# Patient Record
Sex: FEMALE | Race: WHITE | NOT HISPANIC OR LATINO
[De-identification: names, ages, dates, MRNs, and addresses within clinical notes are randomized per-mention and may not be internally consistent; named-entity substitution may affect disease eponyms.]

---

## 2017-08-31 ENCOUNTER — TRANSCRIPTION ENCOUNTER (OUTPATIENT)
Age: 32
End: 2017-08-31

## 2018-02-24 ENCOUNTER — TRANSCRIPTION ENCOUNTER (OUTPATIENT)
Age: 33
End: 2018-02-24

## 2019-10-14 ENCOUNTER — APPOINTMENT (OUTPATIENT)
Dept: OBGYN | Facility: CLINIC | Age: 34
End: 2019-10-14

## 2021-02-22 ENCOUNTER — APPOINTMENT (OUTPATIENT)
Dept: OBGYN | Facility: CLINIC | Age: 36
End: 2021-02-22
Payer: COMMERCIAL

## 2021-02-22 PROCEDURE — 99385 PREV VISIT NEW AGE 18-39: CPT

## 2021-02-22 PROCEDURE — 99072 ADDL SUPL MATRL&STAF TM PHE: CPT

## 2021-02-22 PROCEDURE — 36415 COLL VENOUS BLD VENIPUNCTURE: CPT

## 2021-02-24 ENCOUNTER — TRANSCRIPTION ENCOUNTER (OUTPATIENT)
Age: 36
End: 2021-02-24

## 2021-02-25 ENCOUNTER — APPOINTMENT (OUTPATIENT)
Dept: OBGYN | Facility: CLINIC | Age: 36
End: 2021-02-25

## 2021-03-04 ENCOUNTER — APPOINTMENT (OUTPATIENT)
Dept: ANTEPARTUM | Facility: CLINIC | Age: 36
End: 2021-03-04
Payer: COMMERCIAL

## 2021-03-04 PROCEDURE — 76801 OB US < 14 WKS SINGLE FETUS: CPT

## 2021-03-04 PROCEDURE — 99072 ADDL SUPL MATRL&STAF TM PHE: CPT

## 2021-03-04 PROCEDURE — 76813 OB US NUCHAL MEAS 1 GEST: CPT

## 2021-04-01 ENCOUNTER — APPOINTMENT (OUTPATIENT)
Dept: OBGYN | Facility: CLINIC | Age: 36
End: 2021-04-01
Payer: COMMERCIAL

## 2021-04-01 PROCEDURE — 0502F SUBSEQUENT PRENATAL CARE: CPT

## 2021-04-28 ENCOUNTER — APPOINTMENT (OUTPATIENT)
Dept: ANTEPARTUM | Facility: CLINIC | Age: 36
End: 2021-04-28
Payer: COMMERCIAL

## 2021-04-28 ENCOUNTER — NON-APPOINTMENT (OUTPATIENT)
Age: 36
End: 2021-04-28

## 2021-04-28 PROCEDURE — 76817 TRANSVAGINAL US OBSTETRIC: CPT

## 2021-04-28 PROCEDURE — 99072 ADDL SUPL MATRL&STAF TM PHE: CPT

## 2021-04-28 PROCEDURE — 76805 OB US >/= 14 WKS SNGL FETUS: CPT

## 2021-04-29 ENCOUNTER — APPOINTMENT (OUTPATIENT)
Dept: OBGYN | Facility: CLINIC | Age: 36
End: 2021-04-29
Payer: COMMERCIAL

## 2021-04-29 ENCOUNTER — OUTPATIENT (OUTPATIENT)
Dept: OUTPATIENT SERVICES | Age: 36
LOS: 1 days | Discharge: HOME | End: 2021-04-29

## 2021-04-29 ENCOUNTER — OUTPATIENT (OUTPATIENT)
Dept: OUTPATIENT SERVICES | Facility: HOSPITAL | Age: 36
LOS: 1 days | End: 2021-04-29
Payer: COMMERCIAL

## 2021-04-29 DIAGNOSIS — Q25.45 DOUBLE AORTIC ARCH: ICD-10-CM

## 2021-04-29 PROBLEM — Z00.00 ENCOUNTER FOR PREVENTIVE HEALTH EXAMINATION: Status: ACTIVE | Noted: 2021-04-29

## 2021-04-29 PROCEDURE — 76946 ECHO GUIDE FOR AMNIOCENTESIS: CPT

## 2021-04-29 PROCEDURE — 88271 CYTOGENETICS DNA PROBE: CPT

## 2021-04-29 PROCEDURE — 99072 ADDL SUPL MATRL&STAF TM PHE: CPT

## 2021-04-29 PROCEDURE — 81229 CYTOG ALYS CHRML ABNR SNPCGH: CPT

## 2021-04-29 PROCEDURE — 82106 ALPHA-FETOPROTEIN AMNIOTIC: CPT

## 2021-04-29 PROCEDURE — 88291 CYTO/MOLECULAR REPORT: CPT

## 2021-04-29 PROCEDURE — 88275 CYTOGENETICS 100-300: CPT

## 2021-04-29 PROCEDURE — 88285 CHROMOSOME COUNT ADDITIONAL: CPT

## 2021-04-29 PROCEDURE — 76815 OB US LIMITED FETUS(S): CPT

## 2021-04-29 PROCEDURE — 88280 CHROMOSOME KARYOTYPE STUDY: CPT

## 2021-04-29 PROCEDURE — 88269 CHROMOSOME ANALYS AMNIOTIC: CPT

## 2021-04-29 PROCEDURE — 88235 TISSUE CULTURE PLACENTA: CPT

## 2021-04-30 ENCOUNTER — APPOINTMENT (OUTPATIENT)
Dept: PEDIATRIC CARDIOLOGY | Facility: CLINIC | Age: 36
End: 2021-04-30
Payer: COMMERCIAL

## 2021-04-30 DIAGNOSIS — Z36.89 ENCOUNTER FOR OTHER SPECIFIED ANTENATAL SCREENING: ICD-10-CM

## 2021-04-30 PROCEDURE — 93325 DOPPLER ECHO COLOR FLOW MAPG: CPT | Mod: 59

## 2021-04-30 PROCEDURE — 76827 ECHO EXAM OF FETAL HEART: CPT

## 2021-04-30 PROCEDURE — 99244 OFF/OP CNSLTJ NEW/EST MOD 40: CPT | Mod: 25

## 2021-04-30 PROCEDURE — 76825 ECHO EXAM OF FETAL HEART: CPT

## 2021-04-30 PROCEDURE — 76820 UMBILICAL ARTERY ECHO: CPT

## 2021-04-30 PROCEDURE — 99072 ADDL SUPL MATRL&STAF TM PHE: CPT

## 2021-04-30 PROCEDURE — 76821 MIDDLE CEREBRAL ARTERY ECHO: CPT

## 2021-05-13 ENCOUNTER — APPOINTMENT (OUTPATIENT)
Dept: OBGYN | Facility: CLINIC | Age: 36
End: 2021-05-13
Payer: COMMERCIAL

## 2021-05-13 PROCEDURE — 0502F SUBSEQUENT PRENATAL CARE: CPT

## 2021-05-26 ENCOUNTER — APPOINTMENT (OUTPATIENT)
Dept: PEDIATRIC CARDIOLOGY | Facility: CLINIC | Age: 36
End: 2021-05-26
Payer: COMMERCIAL

## 2021-05-26 PROCEDURE — 99213 OFFICE O/P EST LOW 20 MIN: CPT | Mod: 25

## 2021-05-26 PROCEDURE — 76826 ECHO EXAM OF FETAL HEART: CPT

## 2021-05-26 PROCEDURE — 76820 UMBILICAL ARTERY ECHO: CPT

## 2021-05-26 PROCEDURE — 99072 ADDL SUPL MATRL&STAF TM PHE: CPT

## 2021-05-26 PROCEDURE — 76828 ECHO EXAM OF FETAL HEART: CPT

## 2021-05-26 PROCEDURE — 93325 DOPPLER ECHO COLOR FLOW MAPG: CPT | Mod: 59

## 2021-05-26 PROCEDURE — 76821 MIDDLE CEREBRAL ARTERY ECHO: CPT

## 2021-06-01 ENCOUNTER — APPOINTMENT (OUTPATIENT)
Dept: OBGYN | Facility: CLINIC | Age: 36
End: 2021-06-01
Payer: COMMERCIAL

## 2021-06-01 PROCEDURE — 0502F SUBSEQUENT PRENATAL CARE: CPT

## 2021-06-21 ENCOUNTER — APPOINTMENT (OUTPATIENT)
Dept: ANTEPARTUM | Facility: CLINIC | Age: 36
End: 2021-06-21
Payer: COMMERCIAL

## 2021-06-21 PROCEDURE — 99072 ADDL SUPL MATRL&STAF TM PHE: CPT

## 2021-06-21 PROCEDURE — 76819 FETAL BIOPHYS PROFIL W/O NST: CPT

## 2021-06-21 PROCEDURE — 76816 OB US FOLLOW-UP PER FETUS: CPT

## 2021-06-22 ENCOUNTER — APPOINTMENT (OUTPATIENT)
Dept: ANTEPARTUM | Facility: CLINIC | Age: 36
End: 2021-06-22

## 2021-07-06 ENCOUNTER — APPOINTMENT (OUTPATIENT)
Dept: OBGYN | Facility: CLINIC | Age: 36
End: 2021-07-06
Payer: COMMERCIAL

## 2021-07-06 PROCEDURE — 0502F SUBSEQUENT PRENATAL CARE: CPT

## 2021-07-21 ENCOUNTER — APPOINTMENT (OUTPATIENT)
Dept: OBGYN | Facility: CLINIC | Age: 36
End: 2021-07-21
Payer: COMMERCIAL

## 2021-07-21 PROCEDURE — 0502F SUBSEQUENT PRENATAL CARE: CPT

## 2021-08-04 ENCOUNTER — APPOINTMENT (OUTPATIENT)
Dept: ANTEPARTUM | Facility: CLINIC | Age: 36
End: 2021-08-04
Payer: COMMERCIAL

## 2021-08-04 PROCEDURE — 76816 OB US FOLLOW-UP PER FETUS: CPT

## 2021-08-04 PROCEDURE — 76819 FETAL BIOPHYS PROFIL W/O NST: CPT

## 2021-08-08 ENCOUNTER — OUTPATIENT (OUTPATIENT)
Dept: OUTPATIENT SERVICES | Facility: HOSPITAL | Age: 36
LOS: 1 days | Discharge: HOME | End: 2021-08-08
Payer: COMMERCIAL

## 2021-08-08 VITALS
DIASTOLIC BLOOD PRESSURE: 85 MMHG | HEART RATE: 78 BPM | SYSTOLIC BLOOD PRESSURE: 143 MMHG | TEMPERATURE: 98 F | RESPIRATION RATE: 16 BRPM

## 2021-08-08 DIAGNOSIS — Z3A.00 WEEKS OF GESTATION OF PREGNANCY NOT SPECIFIED: ICD-10-CM

## 2021-08-08 DIAGNOSIS — M67.40 GANGLION, UNSPECIFIED SITE: Chronic | ICD-10-CM

## 2021-08-08 DIAGNOSIS — O26.899 OTHER SPECIFIED PREGNANCY RELATED CONDITIONS, UNSPECIFIED TRIMESTER: ICD-10-CM

## 2021-08-08 PROCEDURE — 76818 FETAL BIOPHYS PROFILE W/NST: CPT | Mod: 26

## 2021-08-08 NOTE — OB RN TRIAGE NOTE - NSNURSINGINSTR_OBGYN_ALL_OB_FT
pt evaluated for ptl and pec, no evidence.  pt d/c to home with instructions given by hema np, pt to follwup with  in the office.  plan d/w .

## 2021-08-08 NOTE — OB RN TRIAGE NOTE - CURRENT PREGNANCY COMPLICATIONS, OB PROFILE
IU pregnancy/Gestational Age less than 36 Weeks/Hypertensive Disorder/Other IUI pregnancy/Gestational Age less than 36 Weeks/Hypertensive Disorder/Other

## 2021-08-08 NOTE — OB RN TRIAGE NOTE - CHIEF COMPLAINT QUOTE
"My mucus plug came out, has lower abdominal pains , headache and  high blood pressure at home 170/90 and 165 /91 , I am supposed to be induced for high blood pressures on last week of August " "My mucus plug came out, I have been spotting since last night and havingl lower abdominal pains. Headache  since 1800 and  high blood pressure at home 170/90 and 165 /91 , I am supposed to be induced for high blood pressures on last week of August " "My mucus plug came out, I have been spotting since last night and having lower abdominal pains. Headache  since 1800 and  high blood pressure at home 170/90 and 165 /91 , I am supposed to be induced for high blood pressures on last week of August "

## 2021-08-08 NOTE — OB RN TRIAGE NOTE - NSMATERNALFETALCONCERNS_OBGYN_ALL_OB_FT
FETAL ALERT  RIGHT aortic arch with left ductus arteriosus which makes up a vascular ring , the possibilty of a double aortic arch cannot be completely ruled out

## 2021-08-09 VITALS — SYSTOLIC BLOOD PRESSURE: 130 MMHG | DIASTOLIC BLOOD PRESSURE: 70 MMHG | HEART RATE: 57 BPM

## 2021-08-09 LAB
ALBUMIN SERPL ELPH-MCNC: 3.4 G/DL — SIGNIFICANT CHANGE UP (ref 3.3–5)
ALP SERPL-CCNC: 115 U/L — SIGNIFICANT CHANGE UP (ref 40–120)
ALT FLD-CCNC: 14 U/L — SIGNIFICANT CHANGE UP (ref 4–33)
ANION GAP SERPL CALC-SCNC: 13 MMOL/L — SIGNIFICANT CHANGE UP (ref 7–14)
APPEARANCE UR: CLEAR — SIGNIFICANT CHANGE UP
APTT BLD: 26.2 SEC — LOW (ref 27–36.3)
AST SERPL-CCNC: 19 U/L — SIGNIFICANT CHANGE UP (ref 4–32)
BACTERIA # UR AUTO: ABNORMAL
BASOPHILS # BLD AUTO: 0.08 K/UL — SIGNIFICANT CHANGE UP (ref 0–0.2)
BASOPHILS NFR BLD AUTO: 0.6 % — SIGNIFICANT CHANGE UP (ref 0–2)
BILIRUB SERPL-MCNC: <0.2 MG/DL — SIGNIFICANT CHANGE UP (ref 0.2–1.2)
BILIRUB UR-MCNC: NEGATIVE — SIGNIFICANT CHANGE UP
BUN SERPL-MCNC: 14 MG/DL — SIGNIFICANT CHANGE UP (ref 7–23)
CALCIUM SERPL-MCNC: 9.8 MG/DL — SIGNIFICANT CHANGE UP (ref 8.4–10.5)
CHLORIDE SERPL-SCNC: 102 MMOL/L — SIGNIFICANT CHANGE UP (ref 98–107)
CO2 SERPL-SCNC: 19 MMOL/L — LOW (ref 22–31)
COLOR SPEC: SIGNIFICANT CHANGE UP
CREAT ?TM UR-MCNC: 100 MG/DL — SIGNIFICANT CHANGE UP
CREAT SERPL-MCNC: 0.76 MG/DL — SIGNIFICANT CHANGE UP (ref 0.5–1.3)
DIFF PNL FLD: ABNORMAL
EOSINOPHIL # BLD AUTO: 0.4 K/UL — SIGNIFICANT CHANGE UP (ref 0–0.5)
EOSINOPHIL NFR BLD AUTO: 3.1 % — SIGNIFICANT CHANGE UP (ref 0–6)
EPI CELLS # UR: 3 /HPF — SIGNIFICANT CHANGE UP (ref 0–5)
FIBRINOGEN PPP-MCNC: 677 MG/DL — HIGH (ref 290–520)
GLUCOSE SERPL-MCNC: 104 MG/DL — HIGH (ref 70–99)
GLUCOSE UR QL: NEGATIVE — SIGNIFICANT CHANGE UP
HCT VFR BLD CALC: 32.2 % — LOW (ref 34.5–45)
HGB BLD-MCNC: 10.8 G/DL — LOW (ref 11.5–15.5)
IANC: 8.44 K/UL — SIGNIFICANT CHANGE UP (ref 1.5–8.5)
IMM GRANULOCYTES NFR BLD AUTO: 1.8 % — HIGH (ref 0–1.5)
INR BLD: 1.02 RATIO — SIGNIFICANT CHANGE UP (ref 0.88–1.16)
KETONES UR-MCNC: NEGATIVE — SIGNIFICANT CHANGE UP
LDH SERPL L TO P-CCNC: 180 U/L — SIGNIFICANT CHANGE UP (ref 135–225)
LEUKOCYTE ESTERASE UR-ACNC: NEGATIVE — SIGNIFICANT CHANGE UP
LYMPHOCYTES # BLD AUTO: 23.4 % — SIGNIFICANT CHANGE UP (ref 13–44)
LYMPHOCYTES # BLD AUTO: 3.05 K/UL — SIGNIFICANT CHANGE UP (ref 1–3.3)
MCHC RBC-ENTMCNC: 29.6 PG — SIGNIFICANT CHANGE UP (ref 27–34)
MCHC RBC-ENTMCNC: 33.5 GM/DL — SIGNIFICANT CHANGE UP (ref 32–36)
MCV RBC AUTO: 88.2 FL — SIGNIFICANT CHANGE UP (ref 80–100)
MONOCYTES # BLD AUTO: 0.82 K/UL — SIGNIFICANT CHANGE UP (ref 0–0.9)
MONOCYTES NFR BLD AUTO: 6.3 % — SIGNIFICANT CHANGE UP (ref 2–14)
NEUTROPHILS # BLD AUTO: 8.44 K/UL — HIGH (ref 1.8–7.4)
NEUTROPHILS NFR BLD AUTO: 64.8 % — SIGNIFICANT CHANGE UP (ref 43–77)
NITRITE UR-MCNC: NEGATIVE — SIGNIFICANT CHANGE UP
NRBC # BLD: 0 /100 WBCS — SIGNIFICANT CHANGE UP
NRBC # FLD: 0 K/UL — SIGNIFICANT CHANGE UP
PH UR: 6 — SIGNIFICANT CHANGE UP (ref 5–8)
PLATELET # BLD AUTO: 264 K/UL — SIGNIFICANT CHANGE UP (ref 150–400)
POTASSIUM SERPL-MCNC: 3.8 MMOL/L — SIGNIFICANT CHANGE UP (ref 3.5–5.3)
POTASSIUM SERPL-SCNC: 3.8 MMOL/L — SIGNIFICANT CHANGE UP (ref 3.5–5.3)
PROT ?TM UR-MCNC: 13 MG/DL — SIGNIFICANT CHANGE UP
PROT ?TM UR-MCNC: 13 MG/DL — SIGNIFICANT CHANGE UP
PROT SERPL-MCNC: 6.2 G/DL — SIGNIFICANT CHANGE UP (ref 6–8.3)
PROT UR-MCNC: ABNORMAL
PROT/CREAT UR-RTO: 0.1 RATIO — SIGNIFICANT CHANGE UP (ref 0–0.2)
PROTHROM AB SERPL-ACNC: 11.6 SEC — SIGNIFICANT CHANGE UP (ref 10.6–13.6)
RBC # BLD: 3.65 M/UL — LOW (ref 3.8–5.2)
RBC # FLD: 12.8 % — SIGNIFICANT CHANGE UP (ref 10.3–14.5)
RBC CASTS # UR COMP ASSIST: 2 /HPF — SIGNIFICANT CHANGE UP (ref 0–4)
SODIUM SERPL-SCNC: 134 MMOL/L — LOW (ref 135–145)
SP GR SPEC: 1.02 — SIGNIFICANT CHANGE UP (ref 1.01–1.02)
URATE SERPL-MCNC: 4.3 MG/DL — SIGNIFICANT CHANGE UP (ref 2.5–7)
UROBILINOGEN FLD QL: SIGNIFICANT CHANGE UP
WBC # BLD: 13.03 K/UL — HIGH (ref 3.8–10.5)
WBC # FLD AUTO: 13.03 K/UL — HIGH (ref 3.8–10.5)
WBC UR QL: 2 /HPF — SIGNIFICANT CHANGE UP (ref 0–5)

## 2021-08-09 RX ORDER — ACETAMINOPHEN 500 MG
1000 TABLET ORAL ONCE
Refills: 0 | Status: COMPLETED | OUTPATIENT
Start: 2021-08-09 | End: 2021-08-09

## 2021-08-09 RX ORDER — SODIUM CHLORIDE 9 MG/ML
1000 INJECTION, SOLUTION INTRAVENOUS ONCE
Refills: 0 | Status: COMPLETED | OUTPATIENT
Start: 2021-08-09 | End: 2021-08-09

## 2021-08-09 RX ADMIN — Medication 1000 MILLIGRAM(S): at 00:50

## 2021-08-09 RX ADMIN — Medication 1000 MILLIGRAM(S): at 01:36

## 2021-08-09 RX ADMIN — SODIUM CHLORIDE 2000 MILLILITER(S): 9 INJECTION, SOLUTION INTRAVENOUS at 00:59

## 2021-08-09 NOTE — OB PROVIDER TRIAGE NOTE - NSHPLABSRESULTS_GEN_ALL_CORE
10.8   13.03 )-----------( 264      ( 09 Aug 2021 00:44 )             32.2     PT/INR - ( 09 Aug 2021 00:44 )   PT: 11.6 sec;   INR: 1.02 ratio      PTT - ( 09 Aug 2021 00:44 )  PTT:26.2 sec 10.8   13.03 )-----------( 264      ( 09 Aug 2021 00:44 )             32.2     PT/INR - ( 09 Aug 2021 00:44 )   PT: 11.6 sec;   INR: 1.02 ratio    PTT - ( 09 Aug 2021 00:44 )  PTT:26.2 sec  Fibrinogen:        134<L>  |  102  |  14  ----------------------------<  104<H>  3.8   |  19<L>  |  0.76    Ca    9.8      09 Aug 2021 00:44    TPro  6.2  /  Alb  3.4  /  TBili  <0.2  /  DBili  x   /  AST  19  /  ALT  14  /  AlkPhos  115      Urinalysis Basic - ( 09 Aug 2021 00:44 )    Color: Light Yellow / Appearance: Clear / S.021 / pH: x  Gluc: x / Ketone: Negative  / Bili: Negative / Urobili: <2 mg/dL   Blood: x / Protein: Trace / Nitrite: Negative   Leuk Esterase: Negative / RBC: 2 /HPF / WBC 2 /HPF   Sq Epi: x / Non Sq Epi: 3 /HPF / Bacteria: Occasional 10.8   13.03 )-----------( 264      ( 09 Aug 2021 00:44 )             32.2     PT/INR - ( 09 Aug 2021 00:44 )   PT: 11.6 sec;   INR: 1.02 ratio    PTT - ( 09 Aug 2021 00:44 )  PTT:26.2 sec  Fibrinogen:        134<L>  |  102  |  14  ----------------------------<  104<H>  3.8   |  19<L>  |  0.76    Ca    9.8      09 Aug 2021 00:44    TPro  6.2  /  Alb  3.4  /  TBili  <0.2  /  DBili  x   /  AST  19  /  ALT  14  /  AlkPhos  115      PC Ratio: 0.1    Urinalysis Basic - ( 09 Aug 2021 00:44 )    Color: Light Yellow / Appearance: Clear / S.021 / pH: x  Gluc: x / Ketone: Negative  / Bili: Negative / Urobili: <2 mg/dL   Blood: x / Protein: Trace / Nitrite: Negative   Leuk Esterase: Negative / RBC: 2 /HPF / WBC 2 /HPF   Sq Epi: x / Non Sq Epi: 3 /HPF / Bacteria: Occasional

## 2021-08-09 NOTE — OB PROVIDER TRIAGE NOTE - HISTORY OF PRESENT ILLNESS
Pt. is a 35y/o  EGA 34.6wks reports of losing her mucus plug last night and now sherri every few minutes. Pt. also reports of a headache since 18:00 and blood pressure before coming into triage was 170/90 and 165/91.    AP: gHTN and subchorionic hematoma  Medical Hx: Hashimoto Dx at 10y/o   Surgical HX: Left wrist ganglion cyst removal    OBGYN Hx: PCOS  Meds: Synthroid 112mcg, ASA, PNC  Intolerance: Percocet-Vomiting   Pt. is a 35y/o  EGA 34.6wks reports of losing her mucus plug last night and now sherri every few minutes. Pt. also reports of a headache since 18:00 and blood pressure before coming into triage was 170/90 and 165/91.    AP: IUI pregnancy, gHTN and subchorionic hematoma  Medical Hx: Hashimoto Dx at 8y/o   Surgical HX: Left wrist ganglion cyst removal    OBGYN Hx: PCOS  Meds: Synthroid 112mcg, ASA, PNC  Intolerance: Percocet-Vomiting

## 2021-08-09 NOTE — OB PROVIDER TRIAGE NOTE - NSHPPHYSICALEXAM_GEN_ALL_CORE
ICU Vital Signs Last 24 Hrs  T(C): 36.8 (08 Aug 2021 23:30), Max: 36.8 (08 Aug 2021 23:30)  T(F): 98.2 (08 Aug 2021 23:30), Max: 98.2 (08 Aug 2021 23:30)  HR: 60 (09 Aug 2021 00:42) (60 - 78)  BP: 126/62 (09 Aug 2021 00:42) (124/58 - 143/85)  BP(mean): --  ABP: --  ABP(mean): --  RR: 16 (08 Aug 2021 23:30) (16 - 16)  SpO2: --    Abdomen soft nontender  SVE: Closed/Long  TAS: Cephalic presentation, anterior placenta, DEEPAK: 20.1, BPP: 8/8  FHR:  [place] ICU Vital Signs Last 24 Hrs  T(C): 36.8 (08 Aug 2021 23:30), Max: 36.8 (08 Aug 2021 23:30)  T(F): 98.2 (08 Aug 2021 23:30), Max: 98.2 (08 Aug 2021 23:30)  HR: 60 (09 Aug 2021 00:42) (60 - 78)  BP: 126/62 (09 Aug 2021 00:42) (124/58 - 143/85)  BP(mean): --  ABP: --  ABP(mean): --  RR: 16 (08 Aug 2021 23:30) (16 - 16)  SpO2: --    Abdomen soft nontender  SVE: Closed/Long  TAS: Cephalic presentation, anterior placenta, DEEPAK: 20.1, BPP: 8/8  FHR: 135bpm, moderate variability, accels, no decels,   [place] irregularly

## 2021-08-09 NOTE — OB PROVIDER TRIAGE NOTE - LABOR: CERVICAL CONSISTENCY
[phone] left a VM for pt advising to callback to reschedule appt due to no vaccines in office (storms)
firm

## 2021-08-09 NOTE — OB PROVIDER TRIAGE NOTE - NSMATERNALFETALCONCERNS_OBGYN_ALL_OB_FT
FETAL ALERT  RIGHT aortic arch with left ductus arteriosus which makes up a vascular ring , the possibility of a double aortic arch cannot be completely ruled out

## 2021-08-09 NOTE — OB PROVIDER TRIAGE NOTE - ADDITIONAL INSTRUCTIONS
No evidence of  labor or PEC at this time. Discussed findings with Dr. Diaz. Pt. d/c'd home. Pt. to follow up with next OB appointment. Pt. instructed to return to triage with increase abdominal contractions, leakage of fluid, vaginal bleeding or decrease fetal movement, BP>140/90s, headache unresolved with Tylenol, epigastric/RUQ pain, and N/V. Increase PO hydration encouraged.

## 2021-08-09 NOTE — OB PROVIDER TRIAGE NOTE - NSOBPROVIDERNOTE_OBGYN_ALL_OB_FT
Tylenol 1000mg ordered    @1:45am   Pt. states headache improved with Tylenol.  No evidence of  labor or PEC at this time. Discussed findings with Dr. Diaz. Pt. d/c'd home. Pt. to follow up with next OB appointment. Pt. instructed to return to triage with increase abdominal contractions, leakage of fluid, vaginal bleeding or decrease fetal movement, BP>140/90s, headache unresolved with Tylenol, epigastric/RUQ pain, and N/V. Increase PO hydration encouraged. HELLP labs, Tylenol 1000mg, IV hydration with 1L RL bolus ordered    @1:45am   Pt. states headache improved with Tylenol.  No evidence of  labor or PEC at this time. Discussed findings with Dr. Diaz. Pt. d/c'd home. Pt. to follow up with next OB appointment. Pt. instructed to return to triage with increase abdominal contractions, leakage of fluid, vaginal bleeding or decrease fetal movement, BP>140/90s, headache unresolved with Tylenol, epigastric/RUQ pain, and N/V. Increase PO hydration encouraged.

## 2021-08-10 ENCOUNTER — INPATIENT (INPATIENT)
Facility: HOSPITAL | Age: 36
LOS: 2 days | Discharge: HOME | End: 2021-08-13
Attending: OBSTETRICS & GYNECOLOGY | Admitting: OBSTETRICS & GYNECOLOGY
Payer: COMMERCIAL

## 2021-08-10 ENCOUNTER — APPOINTMENT (OUTPATIENT)
Dept: ANTEPARTUM | Facility: CLINIC | Age: 36
End: 2021-08-10
Payer: COMMERCIAL

## 2021-08-10 VITALS
DIASTOLIC BLOOD PRESSURE: 78 MMHG | HEART RATE: 77 BPM | TEMPERATURE: 98 F | SYSTOLIC BLOOD PRESSURE: 114 MMHG | RESPIRATION RATE: 18 BRPM

## 2021-08-10 DIAGNOSIS — O26.899 OTHER SPECIFIED PREGNANCY RELATED CONDITIONS, UNSPECIFIED TRIMESTER: ICD-10-CM

## 2021-08-10 DIAGNOSIS — Z3A.00 WEEKS OF GESTATION OF PREGNANCY NOT SPECIFIED: ICD-10-CM

## 2021-08-10 DIAGNOSIS — M67.40 GANGLION, UNSPECIFIED SITE: Chronic | ICD-10-CM

## 2021-08-10 PROCEDURE — 76818 FETAL BIOPHYS PROFILE W/NST: CPT

## 2021-08-10 RX ORDER — OXYTOCIN 10 UNIT/ML
333.33 VIAL (ML) INJECTION
Qty: 20 | Refills: 0 | Status: DISCONTINUED | OUTPATIENT
Start: 2021-08-10 | End: 2021-08-11

## 2021-08-10 RX ORDER — BENZOYL PEROXIDE MICRONIZED 5.8 %
1 TOWELETTE (EA) TOPICAL
Qty: 0 | Refills: 0 | DISCHARGE

## 2021-08-10 RX ORDER — SODIUM CHLORIDE 9 MG/ML
1000 INJECTION, SOLUTION INTRAVENOUS
Refills: 0 | Status: DISCONTINUED | OUTPATIENT
Start: 2021-08-10 | End: 2021-08-11

## 2021-08-10 RX ORDER — AMPICILLIN TRIHYDRATE 250 MG
2 CAPSULE ORAL ONCE
Refills: 0 | Status: COMPLETED | OUTPATIENT
Start: 2021-08-10 | End: 2021-08-10

## 2021-08-10 RX ORDER — AMPICILLIN TRIHYDRATE 250 MG
1 CAPSULE ORAL EVERY 4 HOURS
Refills: 0 | Status: DISCONTINUED | OUTPATIENT
Start: 2021-08-10 | End: 2021-08-11

## 2021-08-10 NOTE — OB PROVIDER H&P - PROBLEM SELECTOR PLAN 1
-Admit l&d. Routine labs  -IOL PO cytotec  -Fetus: cat 1 tracing, vertex presentation, continuous monitoring, fetal echo PP   -Ampicillin for PPROM/GBS unknown  -Pain: Patient denies pain management  -GBS pending, urine culture pending   -Covid 19 pending for patient and support person  -Consents signed and witnessed at bedside

## 2021-08-10 NOTE — OB PROVIDER TRIAGE NOTE - NSMATERNALFETALCONCERNS_OBGYN_ALL_OB_FT
FETAL ALERT  RIGHT aorttic arch with left ductus arteriosus which makes up a vascular ring , the possibilty of a double aortic arch cannot be completely ruled out

## 2021-08-10 NOTE — OB PROVIDER H&P - HISTORY OF PRESENT ILLNESS
37 y/o pt 35.2 weeks  IUI pregnancy with known gestational hypertension presents with c/o rupture of membranes, clear odorless fluid @ 1915. pt denies any bleeding or contractions. pt denies any n/v/d, fever or chills. pt endorses +fetal movement  AP complicated by:  -Fetal alert: RIGHT aorttic arch with left ductus arteriosus which makes up a vascular ring , the possibility of a double aortic arch cannot be completely ruled out  -subchorionic hematoma resolved @ 9 weeks   -Gestational hypertension     Allergy:   Percocet- nausea/ vomiting  PMH:  Hypothyroid   PSH:  Left ganglion cyst removal 07'  Right ovarian cyst removed 20'  Polyp removed from uterus 20'  OB: denies  GYN:  PCOS- Metformin 750mg till 12 weeks  Social hx: denies  Medications:  PNV  Synthroid 112mcg  Ferrous sulfate

## 2021-08-10 NOTE — OB PROVIDER TRIAGE NOTE - NSOBPROVIDERNOTE_OBGYN_ALL_OB_FT
37 y/o pt 35.2 weeks  IUI pregnancy with known gestational hypertension presents with PPROM  AP complicated by:  -Fetal alert: RIGHT aorttic arch with left ductus arteriosus which makes up a vascular ring , the possibility of a double aortic arch cannot be completely ruled out, ECHO PP  -subchorionic hematoma resolved @ 9 weeks   -Gestational hypertension   d/w Dr Betts and Dr Bermudez  Plan:  -Admit l&d. Routine labs  -IOL PO cytotec  -Fetus: cat 1 tracing, vertex presentation, continuous monitoring, fetal echo PP   -Ampicillin for PPROM/GBS unknown  -Pain: Patient denies pain management  -GBS pending, urine culture pending   -Covid 19 pending for patient and support person  -Consents signed and witnessed at bedside

## 2021-08-10 NOTE — OB PROVIDER TRIAGE NOTE - HISTORY OF PRESENT ILLNESS
35 y/o pt 35.2 weeks  IUI pregnancy with known gestational hypertension presents with c/o rupture of membranes, clear odorless fluid @ 1915. pt denies any bleeding or contractions. pt denies any n/v/d, fever or chills. pt endorses +fetal movement  AP complicated by:  -Fetal alert: RIGHT aorttic arch with left ductus arteriosus which makes up a vascular ring , the possibility of a double aortic arch cannot be completely ruled out  -subchorionic hematoma resolved @ 9 weeks   -Gestational hypertension     Allergy:   Percocet- nausea/ vomiting  PMH:  Hypothyroid   PSH:  Left ganglion cyst removal 07'  Right ovarian cyst removed 20'  Polyp removed from uterus 20'  OB: denies  GYN:  PCOS- Metformin 750mg till 12 weeks  Social hx: denies  Medications:  PNV  Synthroid 112mcg  Ferrous sulfate

## 2021-08-10 NOTE — OB PROVIDER H&P - NSHPPHYSICALEXAM_GEN_ALL_CORE
Vital Signs Last 24 Hrs  T(C): 36.8 (10 Aug 2021 22:14), Max: 36.8 (10 Aug 2021 22:14)  T(F): 98.2 (10 Aug 2021 22:14), Max: 98.2 (10 Aug 2021 22:14)  HR: 77 (10 Aug 2021 22:14) (77 - 77)  BP: 114/78 (10 Aug 2021 22:14) (114/78 - 114/78)  BP(mean): --  RR: 18 (10 Aug 2021 22:14) (18 - 18)  SpO2: --    Abdomen: soft, non tender. no guarding or rebound tenderness  SVE:  +pooling, clear  +nitrazine  +ferning  1.5/50/-3  TAS: vertex presentation    NSt reactive with moderate variability, cat 1  toco ctx 5-6 minutes

## 2021-08-10 NOTE — OB PROVIDER TRIAGE NOTE - NSHPPHYSICALEXAM_GEN_ALL_CORE
Vital Signs Last 24 Hrs  T(C): 36.8 (10 Aug 2021 22:14), Max: 36.8 (10 Aug 2021 22:14)  T(F): 98.2 (10 Aug 2021 22:14), Max: 98.2 (10 Aug 2021 22:14)  HR: 77 (10 Aug 2021 22:14) (77 - 77)  BP: 114/78 (10 Aug 2021 22:14) (114/78 - 114/78)  BP(mean): --  RR: 18 (10 Aug 2021 22:14) (18 - 18)  SpO2: --    Abdomen: soft, non tender. no guarding or rebound tenderness  SVE:  +pooling, clear  +nitrazine  +ferning  1.5/50/-3  TAS: vertex presentation

## 2021-08-11 ENCOUNTER — TRANSCRIPTION ENCOUNTER (OUTPATIENT)
Age: 36
End: 2021-08-11

## 2021-08-11 DIAGNOSIS — O42.919 PRETERM PREMATURE RUPTURE OF MEMBRANES, UNSPECIFIED AS TO LENGTH OF TIME BETWEEN RUPTURE AND ONSET OF LABOR, UNSPECIFIED TRIMESTER: ICD-10-CM

## 2021-08-11 PROBLEM — E03.9 HYPOTHYROIDISM, UNSPECIFIED: Chronic | Status: ACTIVE | Noted: 2021-08-08

## 2021-08-11 LAB
ANISOCYTOSIS BLD QL: SLIGHT — SIGNIFICANT CHANGE UP
APPEARANCE UR: CLEAR — SIGNIFICANT CHANGE UP
BACTERIA # UR AUTO: NEGATIVE — SIGNIFICANT CHANGE UP
BASOPHILS # BLD AUTO: 0.13 K/UL — SIGNIFICANT CHANGE UP (ref 0–0.2)
BASOPHILS NFR BLD AUTO: 0.9 % — SIGNIFICANT CHANGE UP (ref 0–2)
BILIRUB UR-MCNC: NEGATIVE — SIGNIFICANT CHANGE UP
BLD GP AB SCN SERPL QL: NEGATIVE — SIGNIFICANT CHANGE UP
COLOR SPEC: SIGNIFICANT CHANGE UP
COVID-19 SPIKE DOMAIN AB INTERP: NEGATIVE — SIGNIFICANT CHANGE UP
COVID-19 SPIKE DOMAIN ANTIBODY RESULT: 0.4 U/ML — SIGNIFICANT CHANGE UP
DIFF PNL FLD: NEGATIVE — SIGNIFICANT CHANGE UP
EOSINOPHIL # BLD AUTO: 0.26 K/UL — SIGNIFICANT CHANGE UP (ref 0–0.5)
EOSINOPHIL NFR BLD AUTO: 1.8 % — SIGNIFICANT CHANGE UP (ref 0–6)
EPI CELLS # UR: 2 /HPF — SIGNIFICANT CHANGE UP (ref 0–5)
GIANT PLATELETS BLD QL SMEAR: PRESENT — SIGNIFICANT CHANGE UP
GLUCOSE UR QL: NEGATIVE — SIGNIFICANT CHANGE UP
HBV SURFACE AG SER-ACNC: SIGNIFICANT CHANGE UP
HCT VFR BLD CALC: 33.4 % — LOW (ref 34.5–45)
HGB BLD-MCNC: 11 G/DL — LOW (ref 11.5–15.5)
HIV 1+2 AB+HIV1 P24 AG SERPL QL IA: SIGNIFICANT CHANGE UP
IANC: 9.68 K/UL — HIGH (ref 1.5–8.5)
KETONES UR-MCNC: NEGATIVE — SIGNIFICANT CHANGE UP
LEUKOCYTE ESTERASE UR-ACNC: NEGATIVE — SIGNIFICANT CHANGE UP
LYMPHOCYTES # BLD AUTO: 16.8 % — SIGNIFICANT CHANGE UP (ref 13–44)
LYMPHOCYTES # BLD AUTO: 2.44 K/UL — SIGNIFICANT CHANGE UP (ref 1–3.3)
MACROCYTES BLD QL: SLIGHT — SIGNIFICANT CHANGE UP
MCHC RBC-ENTMCNC: 30 PG — SIGNIFICANT CHANGE UP (ref 27–34)
MCHC RBC-ENTMCNC: 32.9 GM/DL — SIGNIFICANT CHANGE UP (ref 32–36)
MCV RBC AUTO: 91 FL — SIGNIFICANT CHANGE UP (ref 80–100)
MONOCYTES # BLD AUTO: 0.39 K/UL — SIGNIFICANT CHANGE UP (ref 0–0.9)
MONOCYTES NFR BLD AUTO: 2.7 % — SIGNIFICANT CHANGE UP (ref 2–14)
NEUTROPHILS # BLD AUTO: 10.8 K/UL — HIGH (ref 1.8–7.4)
NEUTROPHILS NFR BLD AUTO: 70.8 % — SIGNIFICANT CHANGE UP (ref 43–77)
NEUTS BAND # BLD: 3.5 % — SIGNIFICANT CHANGE UP (ref 0–6)
NITRITE UR-MCNC: NEGATIVE — SIGNIFICANT CHANGE UP
PH UR: 6.5 — SIGNIFICANT CHANGE UP (ref 5–8)
PLAT MORPH BLD: ABNORMAL
PLATELET # BLD AUTO: 266 K/UL — SIGNIFICANT CHANGE UP (ref 150–400)
PLATELET COUNT - ESTIMATE: NORMAL — SIGNIFICANT CHANGE UP
PROT UR-MCNC: ABNORMAL
RBC # BLD: 3.67 M/UL — LOW (ref 3.8–5.2)
RBC # FLD: 13 % — SIGNIFICANT CHANGE UP (ref 10.3–14.5)
RBC BLD AUTO: ABNORMAL
RBC CASTS # UR COMP ASSIST: 2 /HPF — SIGNIFICANT CHANGE UP (ref 0–4)
RH IG SCN BLD-IMP: POSITIVE — SIGNIFICANT CHANGE UP
RH IG SCN BLD-IMP: POSITIVE — SIGNIFICANT CHANGE UP
RUBV IGG SER-ACNC: 3 INDEX — SIGNIFICANT CHANGE UP
RUBV IGG SER-IMP: POSITIVE — SIGNIFICANT CHANGE UP
SARS-COV-2 IGG+IGM SERPL QL IA: 0.4 U/ML — SIGNIFICANT CHANGE UP
SARS-COV-2 IGG+IGM SERPL QL IA: NEGATIVE — SIGNIFICANT CHANGE UP
SARS-COV-2 RNA SPEC QL NAA+PROBE: SIGNIFICANT CHANGE UP
SP GR SPEC: 1.02 — SIGNIFICANT CHANGE UP (ref 1.01–1.02)
T PALLIDUM AB TITR SER: NEGATIVE — SIGNIFICANT CHANGE UP
UROBILINOGEN FLD QL: SIGNIFICANT CHANGE UP
VARIANT LYMPHS # BLD: 3.5 % — SIGNIFICANT CHANGE UP (ref 0–6)
WBC # BLD: 14.53 K/UL — HIGH (ref 3.8–10.5)
WBC # FLD AUTO: 14.53 K/UL — HIGH (ref 3.8–10.5)
WBC UR QL: 1 /HPF — SIGNIFICANT CHANGE UP (ref 0–5)

## 2021-08-11 PROCEDURE — 59400 OBSTETRICAL CARE: CPT | Mod: U7

## 2021-08-11 RX ORDER — DIPHENHYDRAMINE HCL 50 MG
25 CAPSULE ORAL EVERY 6 HOURS
Refills: 0 | Status: DISCONTINUED | OUTPATIENT
Start: 2021-08-11 | End: 2021-08-13

## 2021-08-11 RX ORDER — HYDROCORTISONE 1 %
1 OINTMENT (GRAM) TOPICAL EVERY 6 HOURS
Refills: 0 | Status: DISCONTINUED | OUTPATIENT
Start: 2021-08-11 | End: 2021-08-13

## 2021-08-11 RX ORDER — TETANUS TOXOID, REDUCED DIPHTHERIA TOXOID AND ACELLULAR PERTUSSIS VACCINE, ADSORBED 5; 2.5; 8; 8; 2.5 [IU]/.5ML; [IU]/.5ML; UG/.5ML; UG/.5ML; UG/.5ML
0.5 SUSPENSION INTRAMUSCULAR ONCE
Refills: 0 | Status: COMPLETED | OUTPATIENT
Start: 2021-08-11

## 2021-08-11 RX ORDER — AER TRAVELER 0.5 G/1
1 SOLUTION RECTAL; TOPICAL EVERY 4 HOURS
Refills: 0 | Status: DISCONTINUED | OUTPATIENT
Start: 2021-08-11 | End: 2021-08-13

## 2021-08-11 RX ORDER — OXYTOCIN 10 UNIT/ML
333.33 VIAL (ML) INJECTION
Qty: 20 | Refills: 0 | Status: DISCONTINUED | OUTPATIENT
Start: 2021-08-11 | End: 2021-08-12

## 2021-08-11 RX ORDER — ACETAMINOPHEN 500 MG
975 TABLET ORAL ONCE
Refills: 0 | Status: COMPLETED | OUTPATIENT
Start: 2021-08-11 | End: 2021-08-11

## 2021-08-11 RX ORDER — LANOLIN
1 OINTMENT (GRAM) TOPICAL EVERY 6 HOURS
Refills: 0 | Status: DISCONTINUED | OUTPATIENT
Start: 2021-08-11 | End: 2021-08-13

## 2021-08-11 RX ORDER — MAGNESIUM HYDROXIDE 400 MG/1
30 TABLET, CHEWABLE ORAL
Refills: 0 | Status: DISCONTINUED | OUTPATIENT
Start: 2021-08-11 | End: 2021-08-13

## 2021-08-11 RX ORDER — KETOROLAC TROMETHAMINE 30 MG/ML
30 SYRINGE (ML) INJECTION ONCE
Refills: 0 | Status: DISCONTINUED | OUTPATIENT
Start: 2021-08-11 | End: 2021-08-11

## 2021-08-11 RX ORDER — IBUPROFEN 200 MG
600 TABLET ORAL EVERY 6 HOURS
Refills: 0 | Status: COMPLETED | OUTPATIENT
Start: 2021-08-11 | End: 2022-07-10

## 2021-08-11 RX ORDER — BENZOCAINE 10 %
1 GEL (GRAM) MUCOUS MEMBRANE EVERY 6 HOURS
Refills: 0 | Status: DISCONTINUED | OUTPATIENT
Start: 2021-08-11 | End: 2021-08-13

## 2021-08-11 RX ORDER — DIBUCAINE 1 %
1 OINTMENT (GRAM) RECTAL EVERY 6 HOURS
Refills: 0 | Status: DISCONTINUED | OUTPATIENT
Start: 2021-08-11 | End: 2021-08-13

## 2021-08-11 RX ORDER — SIMETHICONE 80 MG/1
80 TABLET, CHEWABLE ORAL EVERY 4 HOURS
Refills: 0 | Status: DISCONTINUED | OUTPATIENT
Start: 2021-08-11 | End: 2021-08-13

## 2021-08-11 RX ORDER — SODIUM CHLORIDE 9 MG/ML
3 INJECTION INTRAMUSCULAR; INTRAVENOUS; SUBCUTANEOUS EVERY 8 HOURS
Refills: 0 | Status: DISCONTINUED | OUTPATIENT
Start: 2021-08-11 | End: 2021-08-13

## 2021-08-11 RX ORDER — PRAMOXINE HYDROCHLORIDE 150 MG/15G
1 AEROSOL, FOAM RECTAL EVERY 4 HOURS
Refills: 0 | Status: DISCONTINUED | OUTPATIENT
Start: 2021-08-11 | End: 2021-08-13

## 2021-08-11 RX ORDER — OXYTOCIN 10 UNIT/ML
2 VIAL (ML) INJECTION
Qty: 30 | Refills: 0 | Status: DISCONTINUED | OUTPATIENT
Start: 2021-08-11 | End: 2021-08-12

## 2021-08-11 RX ORDER — ACETAMINOPHEN 500 MG
975 TABLET ORAL
Refills: 0 | Status: DISCONTINUED | OUTPATIENT
Start: 2021-08-11 | End: 2021-08-13

## 2021-08-11 RX ADMIN — SODIUM CHLORIDE 125 MILLILITER(S): 9 INJECTION, SOLUTION INTRAVENOUS at 00:59

## 2021-08-11 RX ADMIN — Medication 975 MILLIGRAM(S): at 14:01

## 2021-08-11 RX ADMIN — Medication 216 GRAM(S): at 00:59

## 2021-08-11 RX ADMIN — SODIUM CHLORIDE 3 MILLILITER(S): 9 INJECTION INTRAMUSCULAR; INTRAVENOUS; SUBCUTANEOUS at 21:25

## 2021-08-11 RX ADMIN — Medication 975 MILLIGRAM(S): at 09:00

## 2021-08-11 RX ADMIN — Medication 975 MILLIGRAM(S): at 13:45

## 2021-08-11 RX ADMIN — Medication 2 MILLIUNIT(S)/MIN: at 16:43

## 2021-08-11 RX ADMIN — Medication 975 MILLIGRAM(S): at 21:34

## 2021-08-11 RX ADMIN — Medication 30 MILLIGRAM(S): at 18:50

## 2021-08-11 RX ADMIN — Medication 975 MILLIGRAM(S): at 22:20

## 2021-08-11 RX ADMIN — Medication 975 MILLIGRAM(S): at 08:40

## 2021-08-11 RX ADMIN — Medication 108 GRAM(S): at 07:54

## 2021-08-11 RX ADMIN — Medication 108 GRAM(S): at 12:05

## 2021-08-11 RX ADMIN — Medication 1000 MILLIUNIT(S)/MIN: at 18:50

## 2021-08-11 RX ADMIN — Medication 108 GRAM(S): at 16:00

## 2021-08-11 NOTE — OB RN DELIVERY SUMMARY - NSSELHIDDEN_OBGYN_ALL_OB_FT
[NS_DeliveryAttending1_OBGYN_ALL_OB_FT:[id number]],[NS_DeliveryAssist2_OBGYN_ALL_OB_FT:[id number]],[NS_DeliveryRN_OBGYN_ALL_OB_FT:[id number]]

## 2021-08-11 NOTE — DISCHARGE NOTE OB - MATERIALS PROVIDED
Vaccinations/[facility]  Screening Program/  Immunization Record/Breastfeeding Log/Guide to Postpartum Care/[facility] Hearing Screen Program/Back To Sleep Handout/Shaken Baby Prevention Handout/Birth Certificate Instructions/Tdap Vaccination (VIS Pub Date: 2012)

## 2021-08-11 NOTE — DISCHARGE NOTE OB - PATIENT PORTAL LINK FT
You can access the FollowMyHealth Patient Portal offered by [facility] by registering at the following website: [email or web address]. By joining [facility]’s FollowMyHealth portal, you will also be able to view your health information using other applications (apps) compatible with our system.

## 2021-08-11 NOTE — OB PROVIDER LABOR PROGRESS NOTE - NS_SUBJECTIVE/OBJECTIVE_OBGYN_ALL_OB_FT
Patient examined for cervical change, after 6 minute deceleration to cecilia of 60. ISE placed by PGY2 Dr. Barger. Pitocin off. Patient then found to made rapid descent.
R2 Labor Note    S: Patient evaluated at bedside for cervical change.     O:  T(C): 36.7 (08-11-21 @ 15:05), Max: 37.3 (08-11-21 @ 00:34)  HR: 60 (08-11-21 @ 15:13) (46 - 97)  BP: 106/- (08-11-21 @ 15:11) (100/50 - 144/72)  RR: 15 (08-11-21 @ 01:02) (15 - 18)  SpO2: 100% (08-11-21 @ 15:08) (95% - 100%)

## 2021-08-11 NOTE — DISCHARGE NOTE OB - CARE PROVIDER_API CALL
Jean Trevizo)  MaternalFetal Medicine; Obstetrics and Gynecology  [address] [place] [id number]  Phone: [phone]  Fax: [phone]  Follow Up Time:

## 2021-08-11 NOTE — DISCHARGE NOTE OB - CARE PLAN
1 Assessment and plan of treatment:	paula   Assessment and plan of treatment:	normal diet and activity   Principal Discharge DX:	Vacuum extractor delivery  Assessment and plan of treatment:	normal diet and activity

## 2021-08-11 NOTE — DISCHARGE NOTE OB - MEDICATION SUMMARY - MEDICATIONS TO TAKE
I will START or STAY ON the medications listed below when I get home from the hospital:    ibuprofen 600 mg oral tablet  -- 1 tab(s) by mouth every 6 hours  -- Indication: For pain, as needed

## 2021-08-11 NOTE — OB NEONATOLOGY/PEDIATRICIAN DELIVERY SUMMARY - NSPEDSNEONOTESA_OBGYN_ALL_OB_FT
EOS: 0.54 Called to attend [facility] at 35 and 2/7 weeks for Cat 2 tracing/vacuum assisted delivery. Mom is a 35yo . Prenatal labs: O+, Hep B negative, RPR NR, Rubella pending, HIV negative, GBS pending, COVID negative. PMH significant for elevated BPs, hypothyroidism (on levothyroxine), ganglion cyst (left wrist and right ovary). OB hx significant for IVF pregnancy, subchorionic hematoma, PCOS. Pregnancy complicated by fetal alert for double aortic arch/risk for vascular ring. SROM 1915 on 8/10, ~23 hours PTD, clear fluid. APGARs 9+9. Routine care in DR.  Mom wants to breastfeed but OK with bottle for low glucose, desires Hep B.

## 2021-08-11 NOTE — OB PROVIDER LABOR PROGRESS NOTE - ASSESSMENT
A/P 36y P0 @ 35/2 wks   -IOL: 4Pit   -Analgesia: Epidural in place   -Anticipate     d/w Dr. Jonna Barger PGY-2  
IOL for PPROM   - Will allow tracing to recover and start pushing after patient feel pressure  MINDA Lindsey PGY4  Dr. Coyle at bedside

## 2021-08-11 NOTE — OB RN DELIVERY SUMMARY - NS_SEPSISRSKCALC_OBGYN_ALL_OB_FT
EOS calculated successfully. EOS Risk Factor: 0.5/1000 live births ([facility] national incidence); GA=35w2d; Temp=99.14; ROM=22.783; GBS='Unknown'; Antibiotics='GBS specific antibiotics > 2 hrs prior to birth'

## 2021-08-11 NOTE — OB PROVIDER DELIVERY SUMMARY - NSPROVIDERDELIVERYNOTE_OBGYN_ALL_OB_FT
Vacuum assisted vaginal delivery for category two fetal heart tracing one pull no pop offs. Peds presents at delivery. Shoulders body delivered easily. Delayed cord clamping. Cord clamped and cut. Infant handed to mom. Placenta delivered intact with 3 vessel cord. Vaginal exam revealed intact cervix, sulci, vaginal walls.  Second degree tear in the perineum repaired with 2-0 chromic suture. Excellent hemostasis. Vacuum assisted vaginal delivery for category two fetal heart tracing one pull no pop offs. Peds presents at delivery. Shoulders body delivered easily. Delayed cord clamping. Cord clamped and cut. Infant handed to mom. Placenta delivered intact with 3 vessel cord. Vaginal exam revealed intact cervix, sulci, vaginal walls.  Second degree tear in the perineum repaired with 2-0 chromic suture. Excellent hemostasis.    Cat 2  Discussed risks and options for vacuum delivery. Verbal consent given.    Applied +2/3 oa Mityvac II  One pull and zero pop off.  Excellent maternal effort over intact perineum.  Peds present.

## 2021-08-11 NOTE — CHART NOTE - NSCHARTNOTEFT_GEN_A_CORE
Ms. Ng is a 35 y/o  at 35w2d gestational age admitted for PPROM, now undergoing induction of labor. Prenatal history significant for fetal alert for possible vascular ring; right aortic arch and left ductus arteriosus, unable to exclude double aortic arch; recommendation for nonurgent inpatient  cardiology evaluation recommended prior to hospital discharge.  NICU consulted to discuss what to expect for a delivery at 35 weeks gestation. I met with Ms. Ng and we reviewed the followin.	The NICU team will be present at her delivery and will immediately assess and care for her infant.  The cardiology team will be notified upon delivery, and the infant will be monitored for signs/symptoms of a vascular ring and will have an echocardiogram performed prior to hospital discharge.  2.	The infant may require respiratory support due to prematurity, most commonly in the form of nasal CPAP. It is less likely that any respiratory distress would be related to the baby’s prenatal echo findings, but we will have more information after echocardiogram is performed. Many infants born at 35 weeks do not require respiratory support.  3.	Depending on the clinical status of the infant, enteral feedings may or may not be started immediately. The infant would receive IV fluids as necessary. The infant is also at risk for hypoglycemia due to prematurity.  4.	The infant is at risk for thermoregulation issues.  5.	Length of stay is highly variable and individualized, but at a minimum the infant will need to stay in the NICU for 6 hours off of temperature support and with no hypoglycemia. Cardiac findings on  echocardiogram may also impact length of stay. Reviewed discharge criteria.    Ms. Ng and her partner had the opportunity to ask questions and may contact the NICU at any time if further questions arise.  Thank you for the opportunity to participate in the care of this patient and please inform us of any changes in her status.
PPROM  epidural  po cytotec  5#
Pressure  5-6 80 -1  mod variability  ELIDA Coyle

## 2021-08-11 NOTE — OB PROVIDER DELIVERY SUMMARY - NSSELHIDDEN_OBGYN_ALL_OB_FT
[NS_DeliveryAttending1_OBGYN_ALL_OB_FT:[id number]],[NS_DeliveryAssist2_OBGYN_ALL_OB_FT:[id number]],[NS_DeliveryRN_OBGYN_ALL_OB_FT:[id number]]

## 2021-08-12 LAB
CULTURE RESULTS: SIGNIFICANT CHANGE UP
SPECIMEN SOURCE: SIGNIFICANT CHANGE UP

## 2021-08-12 RX ORDER — IBUPROFEN 200 MG
1 TABLET ORAL
Qty: 0 | Refills: 0 | DISCHARGE
Start: 2021-08-12

## 2021-08-12 RX ORDER — IBUPROFEN 200 MG
600 TABLET ORAL EVERY 6 HOURS
Refills: 0 | Status: DISCONTINUED | OUTPATIENT
Start: 2021-08-12 | End: 2021-08-13

## 2021-08-12 RX ORDER — ASPIRIN/CALCIUM CARB/MAGNESIUM 324 MG
0 TABLET ORAL
Qty: 0 | Refills: 0 | DISCHARGE

## 2021-08-12 RX ORDER — LEVOTHYROXINE SODIUM 125 MCG
112 TABLET ORAL DAILY
Refills: 0 | Status: DISCONTINUED | OUTPATIENT
Start: 2021-08-12 | End: 2021-08-13

## 2021-08-12 RX ORDER — LEVOTHYROXINE SODIUM 125 MCG
1 TABLET ORAL
Qty: 0 | Refills: 0 | DISCHARGE

## 2021-08-12 RX ORDER — SENNA PLUS 8.6 MG/1
1 TABLET ORAL
Refills: 0 | Status: DISCONTINUED | OUTPATIENT
Start: 2021-08-12 | End: 2021-08-13

## 2021-08-12 RX ADMIN — Medication 975 MILLIGRAM(S): at 20:54

## 2021-08-12 RX ADMIN — Medication 975 MILLIGRAM(S): at 09:42

## 2021-08-12 RX ADMIN — Medication 600 MILLIGRAM(S): at 15:00

## 2021-08-12 RX ADMIN — Medication 975 MILLIGRAM(S): at 10:20

## 2021-08-12 RX ADMIN — Medication 975 MILLIGRAM(S): at 21:40

## 2021-08-12 RX ADMIN — Medication 1 TABLET(S): at 14:24

## 2021-08-12 RX ADMIN — Medication 600 MILLIGRAM(S): at 05:23

## 2021-08-12 RX ADMIN — Medication 112 MICROGRAM(S): at 05:15

## 2021-08-12 RX ADMIN — Medication 600 MILLIGRAM(S): at 14:24

## 2021-08-12 NOTE — PROGRESS NOTE ADULT - SUBJECTIVE AND OBJECTIVE BOX
Anesthesia Post-op Note    POD#1 S/P vaginal delivery    Patient is doing well.  OOBAA. Tolerating clears.  Pain is tolerable.  No residual anesthetic issues or complications noted.    Jared Dutton CRNA

## 2021-08-12 NOTE — PROGRESS NOTE ADULT - SUBJECTIVE AND OBJECTIVE BOX
S: 35yo   PPD#1 s/p vacuum assisted  with 2nd degree laceration.  OBHx: gHTN PMH: PCOS was on metformin, hypothyroidism on synthroid.  Patient feels well. Pain is well controlled. She is tolerating a regular diet and passing flatus. She is voiding spontaneously, and ambulating without difficulty. Denies CP/SOB. Denies lightheadedness/dizziness. Denies N/V.    O:  Vitals:  Vital Signs Last 24 Hrs  T(C): 36 (12 Aug 2021 06:00), Max: 37.1 (11 Aug 2021 10:57)  T(F): 96.8 (12 Aug 2021 06:00), Max: 98.78 (11 Aug 2021 10:57)  HR: 74 (12 Aug 2021 06:00) (46 - 87)  BP: 127/64 (12 Aug 2021 06:00) (100/50 - 166/77)  BP(mean): --  RR: 18 (12 Aug 2021 06:00) (18 - 19)  SpO2: 98% (12 Aug 2021 06:00) (77% - 100%)    MEDICATIONS  (STANDING):  acetaminophen   Tablet .. 975 milliGRAM(s) Oral <User Schedule>  diphtheria/tetanus/pertussis (acellular) Vaccine (ADAcel) 0.5 milliLiter(s) IntraMuscular once  ibuprofen  Tablet. 600 milliGRAM(s) Oral every 6 hours  levothyroxine 112 MICROGram(s) Oral daily  misoprostol 1000 MICROGram(s) Rectal once  prenatal multivitamin 1 Tablet(s) Oral daily  sodium chloride 0.9% lock flush 3 milliLiter(s) IV Push every 8 hours      Labs:  Blood type: O Positive  Rubella IgG: RPR: Negative                          11.0<L>   14.53<H> >-----------< 266    (  @ 01:11 )             33.4<L>      Physical Exam:  General: NAD  Abdomen: soft, non-tender, non-distended, fundus firm  Vaginal: Lochia wnl, No hematoma, sutures intact  Extremities: +2 edema, no calf tenderness    A/P: 35yo  PPD#1 vacuum assisted  with 2nd degree laceration.  Patient is stable and doing well post-partum.   - Pain well controlled, continue current pain regimen  - Increase ambulation, SCDs when not ambulating  - Continue regular diet  -gHTN BPs normotensive.  BP cuff script sent to [facility].  To notify MD for BPs >140/90  - Discharge planning    Nora CLARK

## 2021-08-13 VITALS
SYSTOLIC BLOOD PRESSURE: 123 MMHG | DIASTOLIC BLOOD PRESSURE: 51 MMHG | HEART RATE: 65 BPM | TEMPERATURE: 98 F | RESPIRATION RATE: 18 BRPM | OXYGEN SATURATION: 99 %

## 2021-08-13 DIAGNOSIS — Z83.3 FAMILY HISTORY OF DIABETES MELLITUS: ICD-10-CM

## 2021-08-13 DIAGNOSIS — Z82.49 FAMILY HISTORY OF ISCHEMIC HEART DISEASE AND OTHER DISEASES OF THE CIRCULATORY SYSTEM: ICD-10-CM

## 2021-08-13 DIAGNOSIS — E03.9 HYPOTHYROIDISM, UNSPECIFIED: ICD-10-CM

## 2021-08-13 DIAGNOSIS — Z78.9 OTHER SPECIFIED HEALTH STATUS: ICD-10-CM

## 2021-08-13 LAB
CULTURE RESULTS: SIGNIFICANT CHANGE UP
SPECIMEN SOURCE: SIGNIFICANT CHANGE UP

## 2021-08-13 RX ORDER — LEVOTHYROXINE SODIUM 0.11 MG/1
112 TABLET ORAL
Refills: 0 | Status: ACTIVE | COMMUNITY

## 2021-08-13 RX ORDER — TETANUS TOXOID, REDUCED DIPHTHERIA TOXOID AND ACELLULAR PERTUSSIS VACCINE, ADSORBED 5; 2.5; 8; 8; 2.5 [IU]/.5ML; [IU]/.5ML; UG/.5ML; UG/.5ML; UG/.5ML
0.5 SUSPENSION INTRAMUSCULAR ONCE
Refills: 0 | Status: COMPLETED | OUTPATIENT
Start: 2021-08-13 | End: 2021-08-13

## 2021-08-13 RX ORDER — ACETAMINOPHEN 500 MG
2 TABLET ORAL
Qty: 0 | Refills: 0 | DISCHARGE
Start: 2021-08-13

## 2021-08-13 RX ADMIN — Medication 112 MICROGRAM(S): at 06:26

## 2021-08-13 RX ADMIN — Medication 600 MILLIGRAM(S): at 06:26

## 2021-08-13 RX ADMIN — TETANUS TOXOID, REDUCED DIPHTHERIA TOXOID AND ACELLULAR PERTUSSIS VACCINE, ADSORBED 0.5 MILLILITER(S): 5; 2.5; 8; 8; 2.5 SUSPENSION INTRAMUSCULAR at 06:54

## 2021-08-13 NOTE — PROGRESS NOTE ADULT - SUBJECTIVE AND OBJECTIVE BOX
S: 37yo   PPD#2 s/p vacuum assisted  with 2nd degree laceration.  OBHx: gHTN PMH: PCOS was on metformin, hypothyroidism on synthroid.      Attempted to round on patient however, patient was in NICU.  Will reattempt later today.  Patient is scheduled for discharge today.    -Nora CLARK S: 35yo   PPD#2 s/p vacuum assisted  with 2nd degree laceration.  OBHx: gHTN PMH: PCOS was on metformin, hypothyroidism on synthroid.      Attempted to round on patient however, patient was in NICU.  Will reattempt later today.  Patient is scheduled for discharge today.    -Nora Mcginnis AMBER    Append:    35yo   PPD#2 s/p vacuum assisted  with 2nd degree laceration.  OBHx: gHTN PMH: PCOS was on metformin, hypothyroidism on synthroid.  Patient feels well. Pain is well controlled. She is tolerating a regular diet and passing flatus. She is voiding spontaneously, and ambulating without difficulty. Denies CP/SOB. Denies lightheadedness/dizziness. Denies N/    O:  Vitals:   Vital Signs Last 24 Hrs  T(C): 36.5 (13 Aug 2021 05:09), Max: 36.7 (12 Aug 2021 17:37)  T(F): 97.7 (13 Aug 2021 05:09), Max: 98.1 (12 Aug 2021 17:37)  HR: 65 (13 Aug 2021 05:09) (65 - 67)  BP: 123/51 (13 Aug 2021 05:09) (123/51 - 123/64)  BP(mean): --  RR: 18 (13 Aug 2021 05:09) (16 - 18)  SpO2: 99% (13 Aug 2021 05:09) (99% - 99%)    MEDICATIONS  (STANDING):  acetaminophen   Tablet .. 975 milliGRAM(s) Oral <User Schedule>  ibuprofen  Tablet. 600 milliGRAM(s) Oral every 6 hours  levothyroxine 112 MICROGram(s) Oral daily  misoprostol 1000 MICROGram(s) Rectal once  prenatal multivitamin 1 Tablet(s) Oral daily  sodium chloride 0.9% lock flush 3 milliLiter(s) IV Push every 8 hours    MEDICATIONS  (PRN):  benzocaine 20%/menthol 0.5% Spray 1 Spray(s) Topical every 6 hours PRN for Perineal discomfort  dibucaine 1% Ointment 1 Application(s) Topical every 6 hours PRN Perineal discomfort  diphenhydrAMINE 25 milliGRAM(s) Oral every 6 hours PRN Pruritus  hydrocortisone 1% Cream 1 Application(s) Topical every 6 hours PRN Moderate Pain (4-6)  lanolin Ointment 1 Application(s) Topical every 6 hours PRN nipple soreness  magnesium hydroxide Suspension 30 milliLiter(s) Oral two times a day PRN Constipation  pramoxine 1%/zinc 5% Cream 1 Application(s) Topical every 4 hours PRN Moderate Pain (4-6)  senna 1 Tablet(s) Oral two times a day PRN Constipation  simethicone 80 milliGRAM(s) Chew every 4 hours PRN Gas  witch hazel Pads 1 Application(s) Topical every 4 hours PRN Perineal discomfort      Labs:  Blood type: O Positive  Rubella IgG: RPR: Negative                          11.0<L>   14.53<H> >-----------< 266    (  @ 01:11 )             33.4<L>          Physical Exam:  General: NAD  Abdomen: soft, non-tender, non-distended, fundus firm  Vaginal: Lochia wnl, No hematoma, sutures intact  Extremities: +2 edema, no calf tenderness    A/P: 35yo  PPD#2 vacuum assisted  with 2nd degree laceration.  Patient is stable and doing well post-partum.   - Pain well controlled, continue current pain regimen  - Increase ambulation, SCDs when not ambulating  - Continue regular diet  -gHTN BPs normotensive.  BP cuff script sent to [facility] yesterday.  Patient to notify MD for BPs >140/90  - Discharge planning    Nora CLARK

## 2021-08-17 ENCOUNTER — APPOINTMENT (OUTPATIENT)
Dept: OBGYN | Facility: CLINIC | Age: 36
End: 2021-08-17

## 2021-08-17 ENCOUNTER — APPOINTMENT (OUTPATIENT)
Dept: ANTEPARTUM | Facility: CLINIC | Age: 36
End: 2021-08-17

## 2021-09-23 ENCOUNTER — APPOINTMENT (OUTPATIENT)
Dept: OBGYN | Facility: CLINIC | Age: 36
End: 2021-09-23

## 2021-09-23 VITALS — WEIGHT: 179 LBS | SYSTOLIC BLOOD PRESSURE: 142 MMHG | DIASTOLIC BLOOD PRESSURE: 85 MMHG

## 2024-05-06 ENCOUNTER — NON-APPOINTMENT (OUTPATIENT)
Age: 39
End: 2024-05-06

## 2024-05-21 ENCOUNTER — APPOINTMENT (OUTPATIENT)
Dept: ANTEPARTUM | Facility: CLINIC | Age: 39
End: 2024-05-21

## 2024-05-21 ENCOUNTER — APPOINTMENT (OUTPATIENT)
Dept: OBGYN | Facility: CLINIC | Age: 39
End: 2024-05-21
Payer: COMMERCIAL

## 2024-05-21 VITALS
BODY MASS INDEX: 27.49 KG/M2 | WEIGHT: 161 LBS | HEIGHT: 64 IN | DIASTOLIC BLOOD PRESSURE: 79 MMHG | SYSTOLIC BLOOD PRESSURE: 167 MMHG

## 2024-05-21 DIAGNOSIS — Z01.419 ENCOUNTER FOR GYNECOLOGICAL EXAMINATION (GENERAL) (ROUTINE) W/OUT ABNORMAL FINDINGS: ICD-10-CM

## 2024-05-21 DIAGNOSIS — N91.2 AMENORRHEA, UNSPECIFIED: ICD-10-CM

## 2024-05-21 PROCEDURE — 99385 PREV VISIT NEW AGE 18-39: CPT

## 2024-05-21 RX ORDER — ASCORBIC ACID, CHOLECALCIFEROL, .ALPHA.-TOCOPHEROL ACETATE, DL-, PYRIDOXINE, FOLIC ACID, CYANOCOBALAMIN, CALCIUM, FERROUS FUMARATE, MAGNESIUM, DOCONEXENT 85; 200; 10; 25; 1; 12; 140; 27; 45; 300 [IU]/1; [IU]/1; [IU]/1; [IU]/1; MG/1; UG/1; MG/1; MG/1; MG/1; MG/1
27-0.6-0.4-3 CAPSULE, GELATIN COATED ORAL
Qty: 90 | Refills: 3 | Status: ACTIVE | COMMUNITY
Start: 2024-05-21 | End: 1900-01-01

## 2024-05-21 NOTE — COUNSELING
[Nutrition/ Exercise/ Weight Management] : nutrition, exercise, weight management [Body Image] : body image [Vitamins/Supplements] : vitamins/supplements [Other ___] : [unfilled]

## 2024-05-22 LAB
APPEARANCE: CLEAR
BACTERIA: NEGATIVE /HPF
BASOPHILS # BLD AUTO: 0.1 K/UL
BASOPHILS NFR BLD AUTO: 0.8 %
BILIRUBIN URINE: NEGATIVE
BLOOD URINE: NEGATIVE
CAST: 0 /LPF
COLOR: YELLOW
EOSINOPHIL # BLD AUTO: 0.17 K/UL
EOSINOPHIL NFR BLD AUTO: 1.4 %
EPITHELIAL CELLS: 5 /HPF
ESTIMATED AVERAGE GLUCOSE: 111 MG/DL
GLUCOSE QUALITATIVE U: NEGATIVE MG/DL
GLUCOSE SERPL-MCNC: 90 MG/DL
HBA1C MFR BLD HPLC: 5.5 %
HCT VFR BLD CALC: 36.4 %
HGB BLD-MCNC: 11.5 G/DL
HIV1+2 AB SPEC QL IA.RAPID: NONREACTIVE
IMM GRANULOCYTES NFR BLD AUTO: 0.4 %
KETONES URINE: NEGATIVE MG/DL
LEUKOCYTE ESTERASE URINE: NEGATIVE
LYMPHOCYTES # BLD AUTO: 3.47 K/UL
LYMPHOCYTES NFR BLD AUTO: 28.7 %
MAN DIFF?: NORMAL
MCHC RBC-ENTMCNC: 28.9 PG
MCHC RBC-ENTMCNC: 31.6 GM/DL
MCV RBC AUTO: 91.5 FL
MICROSCOPIC-UA: NORMAL
MONOCYTES # BLD AUTO: 0.64 K/UL
MONOCYTES NFR BLD AUTO: 5.3 %
NEUTROPHILS # BLD AUTO: 7.65 K/UL
NEUTROPHILS NFR BLD AUTO: 63.4 %
NITRITE URINE: NEGATIVE
PH URINE: 6
PLATELET # BLD AUTO: 364 K/UL
PROTEIN URINE: NEGATIVE MG/DL
RBC # BLD: 3.98 M/UL
RBC # FLD: 13.5 %
RED BLOOD CELLS URINE: 2 /HPF
SPECIFIC GRAVITY URINE: 1.02
T4 FREE SERPL-MCNC: 1.6 NG/DL
TSH SERPL-ACNC: 2.21 UIU/ML
UROBILINOGEN URINE: 0.2 MG/DL
WBC # FLD AUTO: 12.08 K/UL
WHITE BLOOD CELLS URINE: 0 /HPF

## 2024-05-24 NOTE — PLAN
[FreeTextEntry1] : 39 y/o female presents for annual exam with amenorrhea  -+IUP and +FH seen on sono today -f/u PAP and GC/CT done today -f/u prenatal blood work drawn today -Rx sent for PNV -Recommended start of bASA 81mg before travelling  -RTO 2 weeks for first trimester ultrasound and review of prenatal lab results

## 2024-05-24 NOTE — HISTORY OF PRESENT ILLNESS
[N] : Patient does not use contraception [Y] : Patient is sexually active [LMPDate] : 3/24/24 [MensesFreq] : 33 [PGHxTotal] : 2 [PGxPremature] : 1 [[facility] : [facility] [FreeTextEntry1] :  x1 at 35wks d/t PROM

## 2024-05-27 LAB
ABO + RH PNL BLD: NORMAL
BACTERIA UR CULT: NORMAL
BLD GP AB SCN SERPL QL: NORMAL
C TRACH RRNA SPEC QL NAA+PROBE: NOT DETECTED
CYTOLOGY CVX/VAG DOC THIN PREP: NORMAL
HBV SURFACE AG SER QL: NONREACTIVE
HCV AB SER QL: NONREACTIVE
HCV S/CO RATIO: 0.08 S/CO
HPV 16 E6+E7 MRNA CVX QL NAA+PROBE: NOT DETECTED
HPV HIGH+LOW RISK DNA PNL CVX: NOT DETECTED
HPV18+45 E6+E7 MRNA CVX QL NAA+PROBE: NOT DETECTED
LEAD BLD-MCNC: <1 UG/DL
M TB IFN-G BLD-IMP: NEGATIVE
MEV IGG FLD QL IA: <5 AU/ML
MEV IGG+IGM SER-IMP: NEGATIVE
MUV AB SER-ACNC: POSITIVE
MUV IGG SER QL IA: 97.8 AU/ML
N GONORRHOEA RRNA SPEC QL NAA+PROBE: NOT DETECTED
QUANTIFERON TB PLUS MITOGEN MINUS NIL: >10 IU/ML
QUANTIFERON TB PLUS NIL: 0.03 IU/ML
QUANTIFERON TB PLUS TB1 MINUS NIL: 0.01 IU/ML
QUANTIFERON TB PLUS TB2 MINUS NIL: 0.02 IU/ML
RUBV IGG FLD-ACNC: 2.4 INDEX
RUBV IGG SER-IMP: POSITIVE
SOURCE AMPLIFICATION: NORMAL
T PALLIDUM AB SER QL IA: NEGATIVE

## 2024-06-04 ENCOUNTER — ASOB RESULT (OUTPATIENT)
Age: 39
End: 2024-06-04

## 2024-06-04 ENCOUNTER — APPOINTMENT (OUTPATIENT)
Dept: OBGYN | Facility: CLINIC | Age: 39
End: 2024-06-04
Payer: COMMERCIAL

## 2024-06-04 ENCOUNTER — APPOINTMENT (OUTPATIENT)
Dept: ANTEPARTUM | Facility: CLINIC | Age: 39
End: 2024-06-04
Payer: COMMERCIAL

## 2024-06-04 VITALS
WEIGHT: 165 LBS | SYSTOLIC BLOOD PRESSURE: 136 MMHG | HEIGHT: 64 IN | BODY MASS INDEX: 28.17 KG/M2 | DIASTOLIC BLOOD PRESSURE: 79 MMHG

## 2024-06-04 PROCEDURE — 76801 OB US < 14 WKS SINGLE FETUS: CPT

## 2024-06-04 PROCEDURE — 0501F PRENATAL FLOW SHEET: CPT

## 2024-06-09 LAB
CHROMOSOME13 INTERPRETATION: NORMAL
CHROMOSOME13 TEST RESULT: NORMAL
CHROMOSOME18 INTERPRETATION: NORMAL
CHROMOSOME18 TEST RESULT: NORMAL
CHROMOSOME21 INTERPRETATION: NORMAL
CHROMOSOME21 TEST RESULT: NORMAL
FETAL FRACTION: NORMAL
PERFORMANCE AND LIMITATIONS: NORMAL
SEX CHROMOSOME INTERPRETATION: NORMAL
SEX CHROMOSOME TEST RESULT: NORMAL
VERIFI PRENATAL TEST: NOT DETECTED

## 2024-06-11 ENCOUNTER — APPOINTMENT (OUTPATIENT)
Dept: ANTEPARTUM | Facility: CLINIC | Age: 39
End: 2024-06-11
Payer: COMMERCIAL

## 2024-06-11 ENCOUNTER — ASOB RESULT (OUTPATIENT)
Age: 39
End: 2024-06-11

## 2024-06-11 ENCOUNTER — APPOINTMENT (OUTPATIENT)
Dept: OBGYN | Facility: CLINIC | Age: 39
End: 2024-06-11
Payer: COMMERCIAL

## 2024-06-11 VITALS — WEIGHT: 165 LBS | BODY MASS INDEX: 28.32 KG/M2 | SYSTOLIC BLOOD PRESSURE: 138 MMHG | DIASTOLIC BLOOD PRESSURE: 85 MMHG

## 2024-06-11 DIAGNOSIS — Z34.90 ENCOUNTER FOR SUPERVISION OF NORMAL PREGNANCY, UNSPECIFIED, UNSPECIFIED TRIMESTER: ICD-10-CM

## 2024-06-11 PROCEDURE — 76801 OB US < 14 WKS SINGLE FETUS: CPT

## 2024-06-11 PROCEDURE — 0502F SUBSEQUENT PRENATAL CARE: CPT

## 2024-06-11 PROCEDURE — 76813 OB US NUCHAL MEAS 1 GEST: CPT

## 2024-06-14 ENCOUNTER — NON-APPOINTMENT (OUTPATIENT)
Age: 39
End: 2024-06-14

## 2024-06-14 LAB
ADDITIONAL US: NORMAL
CRL SCAN TWIN B: NORMAL
CRL SCAN: NORMAL
CROWN RUMP LENGTH TWIN B: NORMAL
CROWN RUMP LENGTH: 46.4 MM
DIA MOM: 1.26
DIA VALUE: 318.6 PG/ML
DOWN SYNDROME AGE RISK: NORMAL
DOWN SYNDROME INTERPRETATION: NORMAL
DOWN SYNDROME SCREENING RISK: NORMAL
FIRST TRIMESTER SCREEN COMMENTS: NORMAL
FIRST TRIMESTER SCREEN NOTE: NORMAL
FIRST TRIMESTER SCREEN RESULTS: NORMAL
FIRST TRIMESTER SCREEN TEST RESULTS: ABNORMAL
GEST. AGE ON COLLECTION DATE: 11.3 WEEKS
HCG MOM: 1.06
HCG VALUE: 112.4 IU/ML
MATERNAL AGE AT EDD: 37.5 YR
NT MOM TWIN B: NORMAL
NT TWIN B: NORMAL
NUCHAL TRANSLUCENCY (NT): 1.2 MM
NUCHAL TRANSLUCENCY MOM: 1.06
NUMBER OF FETUSES: 1
PAPP-A MOM: 0.36
PAPP-A VALUE: 188.4 NG/ML
RACE: NORMAL
SONOGRAPHER ID#: NORMAL
TRISOMY 18 AGE RISK: NORMAL
TRISOMY 18 INTERPRETATION: NORMAL
TRISOMY 18 SCREENING RISK: NORMAL
WEIGHT AFP: 165 LBS

## 2024-07-09 ENCOUNTER — APPOINTMENT (OUTPATIENT)
Dept: OBGYN | Facility: CLINIC | Age: 39
End: 2024-07-09
Payer: COMMERCIAL

## 2024-07-09 ENCOUNTER — ASOB RESULT (OUTPATIENT)
Age: 39
End: 2024-07-09

## 2024-07-09 DIAGNOSIS — Z3A.15 15 WEEKS GESTATION OF PREGNANCY: ICD-10-CM

## 2024-07-09 PROCEDURE — 0502F SUBSEQUENT PRENATAL CARE: CPT

## 2024-07-16 ENCOUNTER — APPOINTMENT (OUTPATIENT)
Dept: OBGYN | Facility: CLINIC | Age: 39
End: 2024-07-16
Payer: COMMERCIAL

## 2024-07-16 VITALS
WEIGHT: 170 LBS | DIASTOLIC BLOOD PRESSURE: 84 MMHG | HEIGHT: 64 IN | SYSTOLIC BLOOD PRESSURE: 132 MMHG | BODY MASS INDEX: 29.02 KG/M2

## 2024-07-16 DIAGNOSIS — Z34.90 ENCOUNTER FOR SUPERVISION OF NORMAL PREGNANCY, UNSPECIFIED, UNSPECIFIED TRIMESTER: ICD-10-CM

## 2024-07-16 PROCEDURE — 36415 COLL VENOUS BLD VENIPUNCTURE: CPT

## 2024-07-16 PROCEDURE — 0502F SUBSEQUENT PRENATAL CARE: CPT

## 2024-07-17 LAB
AF-AFP DISCLAIMER: NORMAL
AF-AFP MOM: 0.78
AFP CONCENTRATION: 22.97 NG/ML
AFP INTERPRETATION: NORMAL
AFP MOM CUT-OFF: 2.5
AFP PERCENTILE: 22.3
AFP SCREENING RESULT: NORMAL
AFTER SCREENING RISK OPEN SPINA BIFIDA: NORMAL
BEFORE SCREENING RISK OPEN SPINA BIFIDA: NORMAL
EXTREME ANALYTE ALERT: NO
GESTATIONAL  AGE: NORMAL
MATERNAL WGT: 170
RACE/ETHNICITY: NORMAL

## 2024-08-07 ENCOUNTER — NON-APPOINTMENT (OUTPATIENT)
Age: 39
End: 2024-08-07

## 2024-08-12 ENCOUNTER — APPOINTMENT (OUTPATIENT)
Dept: ANTEPARTUM | Facility: CLINIC | Age: 39
End: 2024-08-12

## 2024-08-12 ENCOUNTER — APPOINTMENT (OUTPATIENT)
Dept: OBGYN | Facility: CLINIC | Age: 39
End: 2024-08-12

## 2024-08-13 ENCOUNTER — APPOINTMENT (OUTPATIENT)
Dept: OBGYN | Facility: CLINIC | Age: 39
End: 2024-08-13

## 2024-08-13 ENCOUNTER — ASOB RESULT (OUTPATIENT)
Age: 39
End: 2024-08-13

## 2024-08-13 ENCOUNTER — APPOINTMENT (OUTPATIENT)
Dept: ANTEPARTUM | Facility: CLINIC | Age: 39
End: 2024-08-13

## 2024-08-13 VITALS — WEIGHT: 177 LBS | BODY MASS INDEX: 30.38 KG/M2 | DIASTOLIC BLOOD PRESSURE: 79 MMHG | SYSTOLIC BLOOD PRESSURE: 151 MMHG

## 2024-08-13 VITALS — SYSTOLIC BLOOD PRESSURE: 133 MMHG | DIASTOLIC BLOOD PRESSURE: 82 MMHG

## 2024-08-13 PROCEDURE — 0502F SUBSEQUENT PRENATAL CARE: CPT

## 2024-08-13 PROCEDURE — 76811 OB US DETAILED SNGL FETUS: CPT | Mod: 59

## 2024-08-13 PROCEDURE — 76817 TRANSVAGINAL US OBSTETRIC: CPT

## 2024-08-14 LAB
T4 FREE SERPL-MCNC: 1.1 NG/DL
TSH SERPL-ACNC: 2.54 UIU/ML

## 2024-08-27 ENCOUNTER — APPOINTMENT (OUTPATIENT)
Dept: PEDIATRIC CARDIOLOGY | Facility: CLINIC | Age: 39
End: 2024-08-27

## 2024-08-27 PROCEDURE — 76821 MIDDLE CEREBRAL ARTERY ECHO: CPT | Mod: 26

## 2024-08-27 PROCEDURE — 76820 UMBILICAL ARTERY ECHO: CPT | Mod: 26

## 2024-08-27 PROCEDURE — 99203 OFFICE O/P NEW LOW 30 MIN: CPT

## 2024-08-27 PROCEDURE — 76827 ECHO EXAM OF FETAL HEART: CPT

## 2024-08-27 PROCEDURE — 76825 ECHO EXAM OF FETAL HEART: CPT

## 2024-09-17 ENCOUNTER — APPOINTMENT (OUTPATIENT)
Dept: OBGYN | Facility: CLINIC | Age: 39
End: 2024-09-17
Payer: COMMERCIAL

## 2024-09-17 VITALS
HEIGHT: 64 IN | BODY MASS INDEX: 31.92 KG/M2 | SYSTOLIC BLOOD PRESSURE: 127 MMHG | DIASTOLIC BLOOD PRESSURE: 75 MMHG | WEIGHT: 187 LBS

## 2024-09-17 DIAGNOSIS — Z34.90 ENCOUNTER FOR SUPERVISION OF NORMAL PREGNANCY, UNSPECIFIED, UNSPECIFIED TRIMESTER: ICD-10-CM

## 2024-09-17 PROCEDURE — 0502F SUBSEQUENT PRENATAL CARE: CPT

## 2024-10-01 DIAGNOSIS — D64.9 ANEMIA, UNSPECIFIED: ICD-10-CM

## 2024-10-02 RX ORDER — LEVOTHYROXINE SODIUM 0.15 MG/1
150 TABLET ORAL
Qty: 10 | Refills: 4 | Status: ACTIVE | COMMUNITY
Start: 2024-10-01 | End: 1900-01-01

## 2024-10-02 RX ORDER — FERRIC MALTOL 30 MG/1
30 CAPSULE ORAL
Qty: 3 | Refills: 0 | Status: ACTIVE | COMMUNITY
Start: 2024-10-01 | End: 1900-01-01

## 2024-10-08 ENCOUNTER — APPOINTMENT (OUTPATIENT)
Dept: OBGYN | Facility: CLINIC | Age: 39
End: 2024-10-08
Payer: COMMERCIAL

## 2024-10-08 VITALS — DIASTOLIC BLOOD PRESSURE: 78 MMHG | SYSTOLIC BLOOD PRESSURE: 166 MMHG | BODY MASS INDEX: 32.96 KG/M2 | WEIGHT: 192 LBS

## 2024-10-08 PROCEDURE — 0502F SUBSEQUENT PRENATAL CARE: CPT

## 2024-10-22 ENCOUNTER — ASOB RESULT (OUTPATIENT)
Age: 39
End: 2024-10-22

## 2024-10-22 ENCOUNTER — APPOINTMENT (OUTPATIENT)
Dept: ANTEPARTUM | Facility: CLINIC | Age: 39
End: 2024-10-22
Payer: COMMERCIAL

## 2024-10-22 ENCOUNTER — APPOINTMENT (OUTPATIENT)
Dept: OBGYN | Facility: CLINIC | Age: 39
End: 2024-10-22
Payer: COMMERCIAL

## 2024-10-22 VITALS
DIASTOLIC BLOOD PRESSURE: 83 MMHG | SYSTOLIC BLOOD PRESSURE: 156 MMHG | BODY MASS INDEX: 32.78 KG/M2 | HEIGHT: 64 IN | WEIGHT: 192 LBS

## 2024-10-22 VITALS — SYSTOLIC BLOOD PRESSURE: 157 MMHG | DIASTOLIC BLOOD PRESSURE: 83 MMHG

## 2024-10-22 PROCEDURE — 76819 FETAL BIOPHYS PROFIL W/O NST: CPT | Mod: 59

## 2024-10-22 PROCEDURE — 0502F SUBSEQUENT PRENATAL CARE: CPT

## 2024-10-22 PROCEDURE — 76816 OB US FOLLOW-UP PER FETUS: CPT

## 2024-10-23 ENCOUNTER — NON-APPOINTMENT (OUTPATIENT)
Age: 39
End: 2024-10-23

## 2024-11-04 NOTE — OB NEONATOLOGY/PEDIATRICIAN DELIVERY SUMMARY - BABY A: APGAR 5 MIN RESP RATE, DELIVERY
(2) good, crying
Quality 130: Documentation Of Current Medications In The Medical Record: Current Medications Documented
Detail Level: Detailed
Quality 431: Preventive Care And Screening: Unhealthy Alcohol Use - Screening: Patient not identified as an unhealthy alcohol user when screened for unhealthy alcohol use using a systematic screening method
Quality 226: Preventive Care And Screening: Tobacco Use: Screening And Cessation Intervention: Patient screened for tobacco use and is an ex/non-smoker

## 2024-11-05 ENCOUNTER — NON-APPOINTMENT (OUTPATIENT)
Age: 39
End: 2024-11-05

## 2024-11-05 ENCOUNTER — APPOINTMENT (OUTPATIENT)
Dept: OBGYN | Facility: CLINIC | Age: 39
End: 2024-11-05

## 2024-11-05 VITALS — WEIGHT: 196 LBS | BODY MASS INDEX: 33.64 KG/M2 | SYSTOLIC BLOOD PRESSURE: 184 MMHG | DIASTOLIC BLOOD PRESSURE: 76 MMHG

## 2024-11-05 DIAGNOSIS — O13.3 GESTATIONAL [PREGNANCY-INDUCED] HYPERTENSION W/OUT SIGNIFICANT PROTEINURIA, THIRD TRIMESTER: ICD-10-CM

## 2024-11-05 PROCEDURE — 0502F SUBSEQUENT PRENATAL CARE: CPT

## 2024-11-05 PROCEDURE — 36415 COLL VENOUS BLD VENIPUNCTURE: CPT

## 2024-11-06 LAB
ALBUMIN SERPL ELPH-MCNC: 3.6 G/DL
ALP BLD-CCNC: 100 U/L
ALT SERPL-CCNC: 13 U/L
ANION GAP SERPL CALC-SCNC: 13 MMOL/L
AST SERPL-CCNC: 18 U/L
BILIRUB SERPL-MCNC: 0.2 MG/DL
BUN SERPL-MCNC: 9 MG/DL
CALCIUM SERPL-MCNC: 9.5 MG/DL
CHLORIDE SERPL-SCNC: 102 MMOL/L
CO2 SERPL-SCNC: 21 MMOL/L
CREAT SERPL-MCNC: 0.53 MG/DL
EGFR: 122 ML/MIN/1.73M2
GLUCOSE SERPL-MCNC: 96 MG/DL
HCT VFR BLD CALC: 31.3 %
HGB BLD-MCNC: 10.3 G/DL
LDH SERPL-CCNC: 194 U/L
MCHC RBC-ENTMCNC: 28.8 PG
MCHC RBC-ENTMCNC: 32.9 G/DL
MCV RBC AUTO: 87.4 FL
PLATELET # BLD AUTO: 322 K/UL
POTASSIUM SERPL-SCNC: 4.3 MMOL/L
PROT SERPL-MCNC: 6.6 G/DL
RBC # BLD: 3.58 M/UL
RBC # FLD: 13.6 %
SODIUM SERPL-SCNC: 137 MMOL/L
TSH SERPL-ACNC: 1.21 UIU/ML
URATE SERPL-MCNC: 3.4 MG/DL
WBC # FLD AUTO: 9.63 K/UL

## 2024-11-19 ENCOUNTER — ASOB RESULT (OUTPATIENT)
Age: 39
End: 2024-11-19

## 2024-11-19 ENCOUNTER — APPOINTMENT (OUTPATIENT)
Dept: ANTEPARTUM | Facility: CLINIC | Age: 39
End: 2024-11-19

## 2024-11-19 ENCOUNTER — APPOINTMENT (OUTPATIENT)
Dept: OBGYN | Facility: CLINIC | Age: 39
End: 2024-11-19
Payer: COMMERCIAL

## 2024-11-19 VITALS — WEIGHT: 199 LBS | DIASTOLIC BLOOD PRESSURE: 87 MMHG | SYSTOLIC BLOOD PRESSURE: 146 MMHG | BODY MASS INDEX: 34.16 KG/M2

## 2024-11-19 PROCEDURE — 76818 FETAL BIOPHYS PROFILE W/NST: CPT | Mod: 59

## 2024-11-19 PROCEDURE — 76816 OB US FOLLOW-UP PER FETUS: CPT

## 2024-11-19 PROCEDURE — 0502F SUBSEQUENT PRENATAL CARE: CPT

## 2024-11-20 DIAGNOSIS — Z3A.15 15 WEEKS GESTATION OF PREGNANCY: ICD-10-CM

## 2024-11-20 DIAGNOSIS — Z87.51 PERSONAL HISTORY OF PRE-TERM LABOR: ICD-10-CM

## 2024-11-20 PROBLEM — O09.893 HISTORY OF PRETERM DELIVERY, CURRENTLY PREGNANT IN THIRD TRIMESTER: Status: ACTIVE | Noted: 2024-11-20

## 2024-11-22 ENCOUNTER — NON-APPOINTMENT (OUTPATIENT)
Age: 39
End: 2024-11-22

## 2024-11-22 ENCOUNTER — APPOINTMENT (OUTPATIENT)
Dept: ANTEPARTUM | Facility: CLINIC | Age: 39
End: 2024-11-22

## 2024-11-22 ENCOUNTER — APPOINTMENT (OUTPATIENT)
Dept: OBGYN | Facility: CLINIC | Age: 39
End: 2024-11-22
Payer: COMMERCIAL

## 2024-11-22 ENCOUNTER — OUTPATIENT (OUTPATIENT)
Dept: INPATIENT UNIT | Facility: HOSPITAL | Age: 39
LOS: 1 days | Discharge: HOME | End: 2024-11-22
Payer: COMMERCIAL

## 2024-11-22 ENCOUNTER — ASOB RESULT (OUTPATIENT)
Age: 39
End: 2024-11-22

## 2024-11-22 ENCOUNTER — APPOINTMENT (OUTPATIENT)
Dept: ANTEPARTUM | Facility: CLINIC | Age: 39
End: 2024-11-22
Payer: COMMERCIAL

## 2024-11-22 VITALS
RESPIRATION RATE: 14 BRPM | SYSTOLIC BLOOD PRESSURE: 133 MMHG | DIASTOLIC BLOOD PRESSURE: 69 MMHG | HEART RATE: 90 BPM | TEMPERATURE: 98 F

## 2024-11-22 VITALS — DIASTOLIC BLOOD PRESSURE: 90 MMHG | SYSTOLIC BLOOD PRESSURE: 167 MMHG | BODY MASS INDEX: 34.85 KG/M2 | WEIGHT: 203 LBS

## 2024-11-22 VITALS
HEART RATE: 75 BPM | RESPIRATION RATE: 16 BRPM | DIASTOLIC BLOOD PRESSURE: 63 MMHG | TEMPERATURE: 99 F | SYSTOLIC BLOOD PRESSURE: 125 MMHG

## 2024-11-22 VITALS — DIASTOLIC BLOOD PRESSURE: 83 MMHG | SYSTOLIC BLOOD PRESSURE: 151 MMHG

## 2024-11-22 DIAGNOSIS — O26.899 OTHER SPECIFIED PREGNANCY RELATED CONDITIONS, UNSPECIFIED TRIMESTER: ICD-10-CM

## 2024-11-22 DIAGNOSIS — M67.40 GANGLION, UNSPECIFIED SITE: Chronic | ICD-10-CM

## 2024-11-22 PROBLEM — O99.283 HYPOTHYROIDISM DURING PREGNANCY IN THIRD TRIMESTER: Status: ACTIVE | Noted: 2024-11-20

## 2024-11-22 PROBLEM — O09.93 SUPERVISION OF HIGH RISK PREGNANCY IN THIRD TRIMESTER: Status: ACTIVE | Noted: 2024-11-20

## 2024-11-22 PROBLEM — Z3A.34 34 WEEKS GESTATION OF PREGNANCY: Status: ACTIVE | Noted: 2024-11-20

## 2024-11-22 LAB
ALBUMIN SERPL ELPH-MCNC: 3.5 G/DL — SIGNIFICANT CHANGE UP (ref 3.3–5)
ALP SERPL-CCNC: 104 U/L — SIGNIFICANT CHANGE UP (ref 40–120)
ALT FLD-CCNC: 11 U/L — SIGNIFICANT CHANGE UP (ref 4–33)
ANION GAP SERPL CALC-SCNC: 13 MMOL/L — SIGNIFICANT CHANGE UP (ref 7–14)
APPEARANCE UR: ABNORMAL
AST SERPL-CCNC: 14 U/L — SIGNIFICANT CHANGE UP (ref 4–32)
BACTERIA # UR AUTO: ABNORMAL /HPF
BASOPHILS # BLD AUTO: 0.08 K/UL — SIGNIFICANT CHANGE UP (ref 0–0.2)
BASOPHILS NFR BLD AUTO: 0.7 % — SIGNIFICANT CHANGE UP (ref 0–2)
BILIRUB SERPL-MCNC: <0.2 MG/DL — SIGNIFICANT CHANGE UP (ref 0.2–1.2)
BILIRUB UR-MCNC: NEGATIVE — SIGNIFICANT CHANGE UP
BUN SERPL-MCNC: 11 MG/DL — SIGNIFICANT CHANGE UP (ref 7–23)
CALCIUM SERPL-MCNC: 9.1 MG/DL — SIGNIFICANT CHANGE UP (ref 8.4–10.5)
CAST: 0 /LPF — SIGNIFICANT CHANGE UP (ref 0–4)
CHLORIDE SERPL-SCNC: 105 MMOL/L — SIGNIFICANT CHANGE UP (ref 98–107)
CO2 SERPL-SCNC: 21 MMOL/L — LOW (ref 22–31)
COD CRY URNS QL: PRESENT
COLOR SPEC: YELLOW — SIGNIFICANT CHANGE UP
CREAT ?TM UR-MCNC: 159 MG/DL — SIGNIFICANT CHANGE UP
CREAT SERPL-MCNC: 0.68 MG/DL — SIGNIFICANT CHANGE UP (ref 0.5–1.3)
DIFF PNL FLD: NEGATIVE — SIGNIFICANT CHANGE UP
EGFR: 115 ML/MIN/1.73M2 — SIGNIFICANT CHANGE UP
EOSINOPHIL # BLD AUTO: 0.35 K/UL — SIGNIFICANT CHANGE UP (ref 0–0.5)
EOSINOPHIL NFR BLD AUTO: 3.1 % — SIGNIFICANT CHANGE UP (ref 0–6)
GLUCOSE SERPL-MCNC: 142 MG/DL — HIGH (ref 70–99)
GLUCOSE UR QL: NEGATIVE MG/DL — SIGNIFICANT CHANGE UP
HCT VFR BLD CALC: 32.9 % — LOW (ref 34.5–45)
HGB BLD-MCNC: 10.6 G/DL — LOW (ref 11.5–15.5)
IANC: 6.54 K/UL — SIGNIFICANT CHANGE UP (ref 1.8–7.4)
IMM GRANULOCYTES NFR BLD AUTO: 1.9 % — HIGH (ref 0–0.9)
KETONES UR-MCNC: ABNORMAL MG/DL
LDH SERPL L TO P-CCNC: 144 U/L — SIGNIFICANT CHANGE UP (ref 135–225)
LEUKOCYTE ESTERASE UR-ACNC: ABNORMAL
LYMPHOCYTES # BLD AUTO: 28.7 % — SIGNIFICANT CHANGE UP (ref 13–44)
LYMPHOCYTES # BLD AUTO: 3.22 K/UL — SIGNIFICANT CHANGE UP (ref 1–3.3)
MCHC RBC-ENTMCNC: 28.3 PG — SIGNIFICANT CHANGE UP (ref 27–34)
MCHC RBC-ENTMCNC: 32.2 G/DL — SIGNIFICANT CHANGE UP (ref 32–36)
MCV RBC AUTO: 87.7 FL — SIGNIFICANT CHANGE UP (ref 80–100)
MONOCYTES # BLD AUTO: 0.83 K/UL — SIGNIFICANT CHANGE UP (ref 0–0.9)
MONOCYTES NFR BLD AUTO: 7.4 % — SIGNIFICANT CHANGE UP (ref 2–14)
NEUTROPHILS # BLD AUTO: 6.54 K/UL — SIGNIFICANT CHANGE UP (ref 1.8–7.4)
NEUTROPHILS NFR BLD AUTO: 58.2 % — SIGNIFICANT CHANGE UP (ref 43–77)
NITRITE UR-MCNC: NEGATIVE — SIGNIFICANT CHANGE UP
NRBC # BLD: 0 /100 WBCS — SIGNIFICANT CHANGE UP (ref 0–0)
NRBC # FLD: 0 K/UL — SIGNIFICANT CHANGE UP (ref 0–0)
PH UR: 6 — SIGNIFICANT CHANGE UP (ref 5–8)
PLATELET # BLD AUTO: 313 K/UL — SIGNIFICANT CHANGE UP (ref 150–400)
POTASSIUM SERPL-MCNC: 4.5 MMOL/L — SIGNIFICANT CHANGE UP (ref 3.5–5.3)
POTASSIUM SERPL-SCNC: 4.5 MMOL/L — SIGNIFICANT CHANGE UP (ref 3.5–5.3)
PROT ?TM UR-MCNC: 24 MG/DL — SIGNIFICANT CHANGE UP
PROT SERPL-MCNC: 6.8 G/DL — SIGNIFICANT CHANGE UP (ref 6–8.3)
PROT UR-MCNC: 30 MG/DL
PROT/CREAT UR-RTO: 0.2 RATIO — SIGNIFICANT CHANGE UP (ref 0–0.2)
RBC # BLD: 3.75 M/UL — LOW (ref 3.8–5.2)
RBC # FLD: 13.3 % — SIGNIFICANT CHANGE UP (ref 10.3–14.5)
RBC CASTS # UR COMP ASSIST: 7 /HPF — HIGH (ref 0–4)
REVIEW: SIGNIFICANT CHANGE UP
SODIUM SERPL-SCNC: 139 MMOL/L — SIGNIFICANT CHANGE UP (ref 135–145)
SP GR SPEC: 1.03 — SIGNIFICANT CHANGE UP (ref 1–1.03)
SQUAMOUS # UR AUTO: 58 /HPF — HIGH (ref 0–5)
URATE SERPL-MCNC: 4.2 MG/DL — SIGNIFICANT CHANGE UP (ref 2.5–7)
UROBILINOGEN FLD QL: 1 MG/DL — SIGNIFICANT CHANGE UP (ref 0.2–1)
WBC # BLD: 11.23 K/UL — HIGH (ref 3.8–10.5)
WBC # FLD AUTO: 11.23 K/UL — HIGH (ref 3.8–10.5)
WBC UR QL: 10 /HPF — HIGH (ref 0–5)

## 2024-11-22 PROCEDURE — 76818 FETAL BIOPHYS PROFILE W/NST: CPT

## 2024-11-22 PROCEDURE — 59025 FETAL NON-STRESS TEST: CPT | Mod: 26

## 2024-11-22 PROCEDURE — 99221 1ST HOSP IP/OBS SF/LOW 40: CPT | Mod: 25

## 2024-11-22 PROCEDURE — 0502F SUBSEQUENT PRENATAL CARE: CPT

## 2024-11-22 NOTE — OB PROVIDER TRIAGE NOTE - NSHPPHYSICALEXAM_GEN_ALL_CORE
pt seen and examined    ICU Vital Signs Last 24 Hrs  T(C): 36.8 (22 Nov 2024 17:23), Max: 36.8 (22 Nov 2024 17:23)  T(F): 98.2 (22 Nov 2024 17:23), Max: 98.2 (22 Nov 2024 17:23)  HR: 80 (22 Nov 2024 19:35) (71 - 90)  BP: 133/70 (22 Nov 2024 19:35) (132/69 - 133/70)  BP(mean):   RR: 14 (22 Nov 2024 17:23) (14 - 14)  pt in NAD   lungs clear   heart s1 s2   abd soft gravid   nn tender   placed on EFM   NST  reactive pt seen and examined    ICU Vital Signs Last 24 Hrs  T(C): 36.8 (22 Nov 2024 17:23), Max: 36.8 (22 Nov 2024 17:23)  T(F): 98.2 (22 Nov 2024 17:23), Max: 98.2 (22 Nov 2024 17:23)  HR: 80 (22 Nov 2024 19:35) (71 - 90)  BP: 133/70 (22 Nov 2024 19:35) (132/69 - 133/70)  BP(mean):   RR: 14 (22 Nov 2024 17:23) (14 - 14)  pt in NAD   lungs clear   heart s1 s2   abd soft gravid   nn tender   placed on EFM   NST  reactive   scan not repeated Pt had ATU scan  Dr Trevizo office      [facility]  AAFI  20 BPP 8/8  EFW 3.4 #

## 2024-11-22 NOTE — OB RN TRIAGE NOTE - AS PAIN REST
----- Message from Lynette FREIRE sent at 10/14/2024 11:20 AM EDT -----  2024 - Reclast - [facility]/ [facility] No PA req - ded 0 OOP 6300.00 met 90.00 Rem 6210.00/ JV p/u co-is. Ref#  10/14/2024 eh  ----- Message -----  From: Chaya Parnell MA  Sent: 10/14/2024  10:40 AM EDT  To: Lynette Huizar; Chaya Parnell MA    Ev, can you verify her benefits for Reclast?  thanks  ----- Message -----  From: Chaya Parnell MA  Sent: 10/14/2024   8:07 AM EDT  To: RADHA Norris Colin Ruffin, MD Lipscomb, Shara Y, MA  He is a patient referred to get Prolia approved according to your notes  Labs will be nonsuccessful and it is a co-pay if she would like to go to something cheaper so I told her we could look into zoledronic acid as an alternative.  Please see if we can get that approved  
Reclast ordered by Dr. Simms.   Will be covered 100%. PHONE CALL to patient to scheduled for the Reclast. Scheduled her for 10/30/24 2:00.     Lab Results   Component Value Date     07/11/2024    K 3.9 07/11/2024     07/11/2024    CO2 27 07/11/2024    BUN 12 07/11/2024    CREATININE 0.82 07/11/2024    GLUCOSE 103 (H) 07/11/2024    CALCIUM 10.3 (H) 10/10/2024    BILITOT 0.5 07/11/2024    ALKPHOS 69 07/11/2024    AST 23 07/11/2024    ALT 21 07/11/2024    LABGLOM 74 07/11/2024    GFRAA >60 06/24/2022    AGRATIO 1.8 06/23/2021    GLOB 3.2 07/11/2024       Lab Results   Component Value Date/Time    VITD25 67.2 01/04/2024 02:24 PM      Lab Results   Component Value Date    IPTH 19.5 10/10/2024    CALCIUM 10.3 (H) 10/10/2024    PHOS 4.4 (H) 01/04/2024       
0 (no pain/absence of nonverbal indicators of pain)

## 2024-11-22 NOTE — OB PROVIDER TRIAGE NOTE - NSOBPROVIDERNOTE_OBGYN_ALL_OB_FT
39 year old female   at 34.5 week  r/o PEC 39 year old female   at 34.5 week  G HTN   no evidence of  si PEC       case d/w Dr Dill 39 year old female   at 34.5 week  G HTN   no evidence of  si PEC  on exam        case d/w Dr Dill   cleared for discharge     s/s of PEC reviewed with patient instructed to take BP twice a day and call if /90 or higher    instructed to follow up in office  on 11/25    return to LD if any elevated BP s/s of PEC  s/s of PTL      - Patient to be discharged home with follow up and return precautions  - Please follow up with your obstetrician at your next scheduled appointment. 11/25   - Please return for decreased/no fetal movement, vaginal bleeding similar to that of a period, leaking/gush of fluid, regular contractions occurring 4-5 minutes for one hour or requiring pain medication   - Patient and partner and educated of plan and demonstrate understanding. All questions answered. Discharge instructions provided and signed.   - Discharged at 2120p _   MIKHAIL LEA

## 2024-11-25 DIAGNOSIS — E03.9 HYPOTHYROIDISM, UNSPECIFIED: ICD-10-CM

## 2024-11-25 DIAGNOSIS — O09.813 SUPERVISION OF PREGNANCY RESULTING FROM ASSISTED REPRODUCTIVE TECHNOLOGY, THIRD TRIMESTER: ICD-10-CM

## 2024-11-25 DIAGNOSIS — O13.3 GESTATIONAL [PREGNANCY-INDUCED] HYPERTENSION WITHOUT SIGNIFICANT PROTEINURIA, THIRD TRIMESTER: ICD-10-CM

## 2024-11-25 DIAGNOSIS — O99.283 ENDOCRINE, NUTRITIONAL AND METABOLIC DISEASES COMPLICATING PREGNANCY, THIRD TRIMESTER: ICD-10-CM

## 2024-11-25 DIAGNOSIS — G43.909 MIGRAINE, UNSPECIFIED, NOT INTRACTABLE, WITHOUT STATUS MIGRAINOSUS: ICD-10-CM

## 2024-11-25 DIAGNOSIS — O09.213 SUPERVISION OF PREGNANCY WITH HISTORY OF PRE-TERM LABOR, THIRD TRIMESTER: ICD-10-CM

## 2024-11-25 DIAGNOSIS — O35.8XX0 MATERNAL CARE FOR OTHER (SUSPECTED) FETAL ABNORMALITY AND DAMAGE, NOT APPLICABLE OR UNSPECIFIED: ICD-10-CM

## 2024-11-25 DIAGNOSIS — Z3A.34 34 WEEKS GESTATION OF PREGNANCY: ICD-10-CM

## 2024-11-25 DIAGNOSIS — O99.353 DISEASES OF THE NERVOUS SYSTEM COMPLICATING PREGNANCY, THIRD TRIMESTER: ICD-10-CM

## 2024-11-26 ENCOUNTER — APPOINTMENT (OUTPATIENT)
Dept: OBGYN | Facility: CLINIC | Age: 39
End: 2024-11-26
Payer: COMMERCIAL

## 2024-11-26 ENCOUNTER — APPOINTMENT (OUTPATIENT)
Dept: ANTEPARTUM | Facility: CLINIC | Age: 39
End: 2024-11-26

## 2024-11-26 ENCOUNTER — ASOB RESULT (OUTPATIENT)
Age: 39
End: 2024-11-26

## 2024-11-26 ENCOUNTER — NON-APPOINTMENT (OUTPATIENT)
Age: 39
End: 2024-11-26

## 2024-11-26 VITALS — BODY MASS INDEX: 35.36 KG/M2 | DIASTOLIC BLOOD PRESSURE: 84 MMHG | WEIGHT: 206 LBS | SYSTOLIC BLOOD PRESSURE: 162 MMHG

## 2024-11-26 PROCEDURE — 76818 FETAL BIOPHYS PROFILE W/NST: CPT

## 2024-11-26 PROCEDURE — 0502F SUBSEQUENT PRENATAL CARE: CPT

## 2024-11-27 DIAGNOSIS — Z3A.34 34 WEEKS GESTATION OF PREGNANCY: ICD-10-CM

## 2024-11-29 ENCOUNTER — APPOINTMENT (OUTPATIENT)
Dept: ANTEPARTUM | Facility: CLINIC | Age: 39
End: 2024-11-29
Payer: COMMERCIAL

## 2024-11-29 ENCOUNTER — APPOINTMENT (OUTPATIENT)
Dept: OBGYN | Facility: CLINIC | Age: 39
End: 2024-11-29

## 2024-11-29 ENCOUNTER — NON-APPOINTMENT (OUTPATIENT)
Age: 39
End: 2024-11-29

## 2024-11-29 ENCOUNTER — ASOB RESULT (OUTPATIENT)
Age: 39
End: 2024-11-29

## 2024-11-29 VITALS — WEIGHT: 205 LBS | DIASTOLIC BLOOD PRESSURE: 93 MMHG | SYSTOLIC BLOOD PRESSURE: 166 MMHG | BODY MASS INDEX: 35.19 KG/M2

## 2024-11-29 PROBLEM — G43.909 MIGRAINE, UNSPECIFIED, NOT INTRACTABLE, WITHOUT STATUS MIGRAINOSUS: Chronic | Status: ACTIVE | Noted: 2024-11-22

## 2024-11-29 PROBLEM — Z3A.35 35 WEEKS GESTATION OF PREGNANCY: Status: ACTIVE | Noted: 2024-11-27

## 2024-11-29 PROCEDURE — 76818 FETAL BIOPHYS PROFILE W/NST: CPT

## 2024-11-29 PROCEDURE — 0502F SUBSEQUENT PRENATAL CARE: CPT

## 2024-12-03 ENCOUNTER — APPOINTMENT (OUTPATIENT)
Dept: ANTEPARTUM | Facility: CLINIC | Age: 39
End: 2024-12-03
Payer: COMMERCIAL

## 2024-12-03 ENCOUNTER — NON-APPOINTMENT (OUTPATIENT)
Age: 39
End: 2024-12-03

## 2024-12-03 ENCOUNTER — ASOB RESULT (OUTPATIENT)
Age: 39
End: 2024-12-03

## 2024-12-03 ENCOUNTER — APPOINTMENT (OUTPATIENT)
Dept: OBGYN | Facility: CLINIC | Age: 39
End: 2024-12-03
Payer: COMMERCIAL

## 2024-12-03 PROCEDURE — 76816 OB US FOLLOW-UP PER FETUS: CPT

## 2024-12-03 PROCEDURE — 0502F SUBSEQUENT PRENATAL CARE: CPT

## 2024-12-03 PROCEDURE — 76818 FETAL BIOPHYS PROFILE W/NST: CPT | Mod: 59

## 2024-12-03 PROCEDURE — 36415 COLL VENOUS BLD VENIPUNCTURE: CPT

## 2024-12-06 ENCOUNTER — APPOINTMENT (OUTPATIENT)
Dept: ANTEPARTUM | Facility: CLINIC | Age: 39
End: 2024-12-06

## 2024-12-06 ENCOUNTER — ASOB RESULT (OUTPATIENT)
Age: 39
End: 2024-12-06

## 2024-12-06 ENCOUNTER — APPOINTMENT (OUTPATIENT)
Dept: OBGYN | Facility: CLINIC | Age: 39
End: 2024-12-06

## 2024-12-06 ENCOUNTER — APPOINTMENT (OUTPATIENT)
Dept: ANTEPARTUM | Facility: CLINIC | Age: 39
End: 2024-12-06
Payer: COMMERCIAL

## 2024-12-06 VITALS — BODY MASS INDEX: 35.87 KG/M2 | SYSTOLIC BLOOD PRESSURE: 158 MMHG | DIASTOLIC BLOOD PRESSURE: 94 MMHG | WEIGHT: 209 LBS

## 2024-12-06 VITALS — DIASTOLIC BLOOD PRESSURE: 89 MMHG | SYSTOLIC BLOOD PRESSURE: 138 MMHG

## 2024-12-06 VITALS — SYSTOLIC BLOOD PRESSURE: 147 MMHG | DIASTOLIC BLOOD PRESSURE: 82 MMHG

## 2024-12-06 DIAGNOSIS — O13.3 GESTATIONAL [PREGNANCY-INDUCED] HYPERTENSION W/OUT SIGNIFICANT PROTEINURIA, THIRD TRIMESTER: ICD-10-CM

## 2024-12-06 DIAGNOSIS — O09.893 SUPERVISION OF OTHER HIGH RISK PREGNANCIES, THIRD TRIMESTER: ICD-10-CM

## 2024-12-06 DIAGNOSIS — O99.283 ENDOCRINE, NUTRITIONAL AND METABOLIC DISEASES COMPLICATING PREGNANCY, THIRD TRIMESTER: ICD-10-CM

## 2024-12-06 DIAGNOSIS — O40.3XX0 POLYHYDRAMNIOS, THIRD TRIMESTER, NOT APPLICABLE OR UNSPECIFIED: ICD-10-CM

## 2024-12-06 DIAGNOSIS — Z3A.36 36 WEEKS GESTATION OF PREGNANCY: ICD-10-CM

## 2024-12-06 DIAGNOSIS — E03.9 ENDOCRINE, NUTRITIONAL AND METABOLIC DISEASES COMPLICATING PREGNANCY, THIRD TRIMESTER: ICD-10-CM

## 2024-12-06 DIAGNOSIS — O09.93 SUPERVISION OF HIGH RISK PREGNANCY, UNSPECIFIED, THIRD TRIMESTER: ICD-10-CM

## 2024-12-06 DIAGNOSIS — Z3A.35 35 WEEKS GESTATION OF PREGNANCY: ICD-10-CM

## 2024-12-06 LAB
GP B STREP DNA SPEC QL NAA+PROBE: NOT DETECTED
SOURCE GBS: NORMAL

## 2024-12-06 PROCEDURE — 0502F SUBSEQUENT PRENATAL CARE: CPT

## 2024-12-06 PROCEDURE — 76818 FETAL BIOPHYS PROFILE W/NST: CPT

## 2024-12-11 ENCOUNTER — APPOINTMENT (OUTPATIENT)
Dept: OBGYN | Facility: HOSPITAL | Age: 39
End: 2024-12-11

## 2024-12-12 ENCOUNTER — INPATIENT (INPATIENT)
Facility: HOSPITAL | Age: 39
LOS: 1 days | Discharge: HOME | End: 2024-12-14
Attending: STUDENT IN AN ORGANIZED HEALTH CARE EDUCATION/TRAINING PROGRAM | Admitting: STUDENT IN AN ORGANIZED HEALTH CARE EDUCATION/TRAINING PROGRAM
Payer: COMMERCIAL

## 2024-12-12 VITALS
DIASTOLIC BLOOD PRESSURE: 85 MMHG | SYSTOLIC BLOOD PRESSURE: 163 MMHG | RESPIRATION RATE: 16 BRPM | HEART RATE: 72 BPM | TEMPERATURE: 98 F

## 2024-12-12 DIAGNOSIS — O13.3 GESTATIONAL [PREGNANCY-INDUCED] HYPERTENSION WITHOUT SIGNIFICANT PROTEINURIA, THIRD TRIMESTER: ICD-10-CM

## 2024-12-12 DIAGNOSIS — O13.9 GESTATIONAL [PREGNANCY-INDUCED] HYPERTENSION WITHOUT SIGNIFICANT PROTEINURIA, UNSPECIFIED TRIMESTER: ICD-10-CM

## 2024-12-12 DIAGNOSIS — M67.40 GANGLION, UNSPECIFIED SITE: Chronic | ICD-10-CM

## 2024-12-12 LAB
ALBUMIN SERPL ELPH-MCNC: 3.4 G/DL — SIGNIFICANT CHANGE UP (ref 3.3–5)
ALP SERPL-CCNC: 132 U/L — HIGH (ref 40–120)
ALT FLD-CCNC: 14 U/L — SIGNIFICANT CHANGE UP (ref 4–33)
ANION GAP SERPL CALC-SCNC: 13 MMOL/L — SIGNIFICANT CHANGE UP (ref 7–14)
APPEARANCE UR: ABNORMAL
AST SERPL-CCNC: 28 U/L — SIGNIFICANT CHANGE UP (ref 4–32)
BACTERIA # UR AUTO: NEGATIVE /HPF — SIGNIFICANT CHANGE UP
BASOPHILS # BLD AUTO: 0.1 K/UL — SIGNIFICANT CHANGE UP (ref 0–0.2)
BASOPHILS NFR BLD AUTO: 0.9 % — SIGNIFICANT CHANGE UP (ref 0–2)
BILIRUB SERPL-MCNC: <0.2 MG/DL — SIGNIFICANT CHANGE UP (ref 0.2–1.2)
BILIRUB UR-MCNC: NEGATIVE — SIGNIFICANT CHANGE UP
BLD GP AB SCN SERPL QL: NEGATIVE — SIGNIFICANT CHANGE UP
BUN SERPL-MCNC: 12 MG/DL — SIGNIFICANT CHANGE UP (ref 7–23)
CALCIUM SERPL-MCNC: 9.6 MG/DL — SIGNIFICANT CHANGE UP (ref 8.4–10.5)
CAST: 0 /LPF — SIGNIFICANT CHANGE UP (ref 0–4)
CHLORIDE SERPL-SCNC: 102 MMOL/L — SIGNIFICANT CHANGE UP (ref 98–107)
CO2 SERPL-SCNC: 19 MMOL/L — LOW (ref 22–31)
COLOR SPEC: YELLOW — SIGNIFICANT CHANGE UP
CREAT ?TM UR-MCNC: 45 MG/DL — SIGNIFICANT CHANGE UP
CREAT SERPL-MCNC: 0.67 MG/DL — SIGNIFICANT CHANGE UP (ref 0.5–1.3)
DIFF PNL FLD: NEGATIVE — SIGNIFICANT CHANGE UP
EGFR: 115 ML/MIN/1.73M2 — SIGNIFICANT CHANGE UP
EGFR: 115 ML/MIN/1.73M2 — SIGNIFICANT CHANGE UP
EOSINOPHIL # BLD AUTO: 0.47 K/UL — SIGNIFICANT CHANGE UP (ref 0–0.5)
EOSINOPHIL NFR BLD AUTO: 4 % — SIGNIFICANT CHANGE UP (ref 0–6)
GLUCOSE SERPL-MCNC: 95 MG/DL — SIGNIFICANT CHANGE UP (ref 70–99)
GLUCOSE UR QL: NEGATIVE MG/DL — SIGNIFICANT CHANGE UP
HCT VFR BLD CALC: 34.9 % — SIGNIFICANT CHANGE UP (ref 34.5–45)
HGB BLD-MCNC: 10.9 G/DL — LOW (ref 11.5–15.5)
IANC: 6.51 K/UL — SIGNIFICANT CHANGE UP (ref 1.8–7.4)
IMM GRANULOCYTES NFR BLD AUTO: 0.9 % — SIGNIFICANT CHANGE UP (ref 0–0.9)
KETONES UR-MCNC: NEGATIVE MG/DL — SIGNIFICANT CHANGE UP
LDH SERPL L TO P-CCNC: 252 U/L — HIGH (ref 135–225)
LEUKOCYTE ESTERASE UR-ACNC: NEGATIVE — SIGNIFICANT CHANGE UP
LYMPHOCYTES # BLD AUTO: 3.58 K/UL — HIGH (ref 1–3.3)
LYMPHOCYTES # BLD AUTO: 30.6 % — SIGNIFICANT CHANGE UP (ref 13–44)
MCHC RBC-ENTMCNC: 27.9 PG — SIGNIFICANT CHANGE UP (ref 27–34)
MCHC RBC-ENTMCNC: 31.2 G/DL — LOW (ref 32–36)
MCV RBC AUTO: 89.3 FL — SIGNIFICANT CHANGE UP (ref 80–100)
MONOCYTES # BLD AUTO: 0.93 K/UL — HIGH (ref 0–0.9)
MONOCYTES NFR BLD AUTO: 7.9 % — SIGNIFICANT CHANGE UP (ref 2–14)
NEUTROPHILS # BLD AUTO: 6.51 K/UL — SIGNIFICANT CHANGE UP (ref 1.8–7.4)
NEUTROPHILS NFR BLD AUTO: 55.7 % — SIGNIFICANT CHANGE UP (ref 43–77)
NITRITE UR-MCNC: NEGATIVE — SIGNIFICANT CHANGE UP
NRBC # BLD AUTO: 0 K/UL — SIGNIFICANT CHANGE UP (ref 0–0)
NRBC # BLD: 0 /100 WBCS — SIGNIFICANT CHANGE UP (ref 0–0)
NRBC # FLD: 0 K/UL — SIGNIFICANT CHANGE UP (ref 0–0)
NRBC BLD-RTO: 0 /100 WBCS — SIGNIFICANT CHANGE UP (ref 0–0)
PH UR: 6 — SIGNIFICANT CHANGE UP (ref 5–8)
PLATELET # BLD AUTO: 272 K/UL — SIGNIFICANT CHANGE UP (ref 150–400)
POTASSIUM SERPL-MCNC: 4.3 MMOL/L — SIGNIFICANT CHANGE UP (ref 3.5–5.3)
POTASSIUM SERPL-SCNC: 4.3 MMOL/L — SIGNIFICANT CHANGE UP (ref 3.5–5.3)
PROT ?TM UR-MCNC: 7 MG/DL — SIGNIFICANT CHANGE UP
PROT SERPL-MCNC: 6.9 G/DL — SIGNIFICANT CHANGE UP (ref 6–8.3)
PROT UR-MCNC: NEGATIVE MG/DL — SIGNIFICANT CHANGE UP
PROT/CREAT UR-RTO: 0.2 RATIO — SIGNIFICANT CHANGE UP (ref 0–0.2)
RBC # BLD: 3.91 M/UL — SIGNIFICANT CHANGE UP (ref 3.8–5.2)
RBC # FLD: 13.4 % — SIGNIFICANT CHANGE UP (ref 10.3–14.5)
RBC CASTS # UR COMP ASSIST: 1 /HPF — SIGNIFICANT CHANGE UP (ref 0–4)
RH IG SCN BLD-IMP: POSITIVE — SIGNIFICANT CHANGE UP
RH IG SCN BLD-IMP: POSITIVE — SIGNIFICANT CHANGE UP
SODIUM SERPL-SCNC: 134 MMOL/L — LOW (ref 135–145)
SP GR SPEC: 1.01 — SIGNIFICANT CHANGE UP (ref 1–1.03)
SQUAMOUS # UR AUTO: 3 /HPF — SIGNIFICANT CHANGE UP (ref 0–5)
T PALLIDUM AB TITR SER: NEGATIVE — SIGNIFICANT CHANGE UP
URATE SERPL-MCNC: 4.7 MG/DL — SIGNIFICANT CHANGE UP (ref 2.5–7)
UROBILINOGEN FLD QL: 0.2 MG/DL — SIGNIFICANT CHANGE UP (ref 0.2–1)
WBC # BLD: 11.7 K/UL — HIGH (ref 3.8–10.5)
WBC # FLD AUTO: 11.7 K/UL — HIGH (ref 3.8–10.5)
WBC UR QL: 0 /HPF — SIGNIFICANT CHANGE UP (ref 0–5)

## 2024-12-12 PROCEDURE — 59400 OBSTETRICAL CARE: CPT | Mod: U7

## 2024-12-12 RX ORDER — PRENATAL 136/IRON/FOLIC ACID 27 MG-1 MG
1 TABLET ORAL DAILY
Refills: 0 | Status: DISCONTINUED | OUTPATIENT
Start: 2024-12-12 | End: 2024-12-14

## 2024-12-12 RX ORDER — ACETAMINOPHEN 500 MG/5ML
1000 LIQUID (ML) ORAL ONCE
Refills: 0 | Status: COMPLETED | OUTPATIENT
Start: 2024-12-12 | End: 2024-12-12

## 2024-12-12 RX ORDER — OXYCODONE HYDROCHLORIDE 30 MG/1
5 TABLET ORAL
Refills: 0 | Status: DISCONTINUED | OUTPATIENT
Start: 2024-12-12 | End: 2024-12-14

## 2024-12-12 RX ORDER — LEVOTHYROXINE SODIUM 300 MCG
150 TABLET ORAL DAILY
Refills: 0 | Status: DISCONTINUED | OUTPATIENT
Start: 2024-12-12 | End: 2024-12-12

## 2024-12-12 RX ORDER — IBUPROFEN 200 MG
600 TABLET ORAL EVERY 6 HOURS
Refills: 0 | Status: COMPLETED | OUTPATIENT
Start: 2024-12-12 | End: 2025-11-10

## 2024-12-12 RX ORDER — CITRIC ACID/SODIUM CITRATE 300-500 MG
15 SOLUTION, ORAL ORAL EVERY 6 HOURS
Refills: 0 | Status: DISCONTINUED | OUTPATIENT
Start: 2024-12-12 | End: 2024-12-12

## 2024-12-12 RX ORDER — ACETAMINOPHEN 500 MG/5ML
975 LIQUID (ML) ORAL
Refills: 0 | Status: DISCONTINUED | OUTPATIENT
Start: 2024-12-12 | End: 2024-12-14

## 2024-12-12 RX ORDER — SODIUM CHLORIDE 9 G/1000ML
1000 INJECTION, SOLUTION INTRAVENOUS
Refills: 0 | Status: DISCONTINUED | OUTPATIENT
Start: 2024-12-12 | End: 2024-12-12

## 2024-12-12 RX ORDER — OXYCODONE HYDROCHLORIDE 30 MG/1
5 TABLET ORAL ONCE
Refills: 0 | Status: DISCONTINUED | OUTPATIENT
Start: 2024-12-12 | End: 2024-12-14

## 2024-12-12 RX ORDER — MAGNESIUM HYDROXIDE 400 MG/5ML
30 SUSPENSION ORAL
Refills: 0 | Status: DISCONTINUED | OUTPATIENT
Start: 2024-12-12 | End: 2024-12-14

## 2024-12-12 RX ORDER — LEVOTHYROXINE SODIUM 300 MCG
125 TABLET ORAL DAILY
Refills: 0 | Status: DISCONTINUED | OUTPATIENT
Start: 2024-12-12 | End: 2024-12-14

## 2024-12-12 RX ORDER — BENZOCAINE 220 MG/G
1 SPRAY, METERED PERIODONTAL EVERY 6 HOURS
Refills: 0 | Status: DISCONTINUED | OUTPATIENT
Start: 2024-12-12 | End: 2024-12-14

## 2024-12-12 RX ORDER — KETOROLAC TROMETHAMINE 30 MG/ML
30 INJECTION, SOLUTION INTRAMUSCULAR; INTRAVENOUS ONCE
Refills: 0 | Status: DISCONTINUED | OUTPATIENT
Start: 2024-12-12 | End: 2024-12-12

## 2024-12-12 RX ORDER — IBUPROFEN 200 MG
600 TABLET ORAL EVERY 6 HOURS
Refills: 0 | Status: DISCONTINUED | OUTPATIENT
Start: 2024-12-12 | End: 2024-12-14

## 2024-12-12 RX ORDER — WITCH HAZEL LEAF
1 FLUID EXTRACT MISCELLANEOUS EVERY 4 HOURS
Refills: 0 | Status: DISCONTINUED | OUTPATIENT
Start: 2024-12-12 | End: 2024-12-14

## 2024-12-12 RX ORDER — MODIFIED LANOLIN 100 %
1 CREAM (GRAM) TOPICAL EVERY 6 HOURS
Refills: 0 | Status: DISCONTINUED | OUTPATIENT
Start: 2024-12-12 | End: 2024-12-14

## 2024-12-12 RX ORDER — OXYTOCIN-SODIUM CHLORIDE 0.9% IV SOLN 30 UNIT/500ML 30-0.9/5 UT/ML-%
167 SOLUTION INTRAVENOUS
Qty: 30 | Refills: 0 | Status: DISCONTINUED | OUTPATIENT
Start: 2024-12-12 | End: 2024-12-12

## 2024-12-12 RX ORDER — PRAMOXINE HCL 1 %
1 GEL (GRAM) TOPICAL EVERY 4 HOURS
Refills: 0 | Status: DISCONTINUED | OUTPATIENT
Start: 2024-12-12 | End: 2024-12-14

## 2024-12-12 RX ORDER — SIMETHICONE 80 MG
80 TABLET,CHEWABLE ORAL EVERY 4 HOURS
Refills: 0 | Status: DISCONTINUED | OUTPATIENT
Start: 2024-12-12 | End: 2024-12-14

## 2024-12-12 RX ORDER — DIPHENHYDRAMINE HCL 12.5MG/5ML
25 ELIXIR ORAL EVERY 6 HOURS
Refills: 0 | Status: DISCONTINUED | OUTPATIENT
Start: 2024-12-12 | End: 2024-12-14

## 2024-12-12 RX ORDER — SODIUM CHLORIDE 9 G/1000ML
500 INJECTION, SOLUTION INTRAVENOUS ONCE
Refills: 0 | Status: COMPLETED | OUTPATIENT
Start: 2024-12-12 | End: 2024-12-12

## 2024-12-12 RX ORDER — DIBUCAINE 10 MG/G
1 OINTMENT TOPICAL EVERY 6 HOURS
Refills: 0 | Status: DISCONTINUED | OUTPATIENT
Start: 2024-12-12 | End: 2024-12-14

## 2024-12-12 RX ORDER — HYDROCORTISONE 10 MG/G
1 CREAM TOPICAL EVERY 6 HOURS
Refills: 0 | Status: DISCONTINUED | OUTPATIENT
Start: 2024-12-12 | End: 2024-12-14

## 2024-12-12 RX ORDER — OXYTOCIN-SODIUM CHLORIDE 0.9% IV SOLN 30 UNIT/500ML 30-0.9/5 UT/ML-%
SOLUTION INTRAVENOUS
Qty: 30 | Refills: 0 | Status: DISCONTINUED | OUTPATIENT
Start: 2024-12-12 | End: 2024-12-12

## 2024-12-12 RX ADMIN — Medication 600 MILLIGRAM(S): at 23:16

## 2024-12-12 RX ADMIN — Medication 975 MILLIGRAM(S): at 22:07

## 2024-12-12 RX ADMIN — SODIUM CHLORIDE 500 MILLILITER(S): 9 INJECTION, SOLUTION INTRAVENOUS at 15:52

## 2024-12-12 RX ADMIN — Medication 600 MILLIGRAM(S): at 23:46

## 2024-12-12 RX ADMIN — SODIUM CHLORIDE 1000 MILLILITER(S): 9 INJECTION, SOLUTION INTRAVENOUS at 10:52

## 2024-12-12 RX ADMIN — Medication 125 MILLILITER(S): at 15:26

## 2024-12-12 RX ADMIN — Medication 400 MILLIGRAM(S): at 14:38

## 2024-12-12 RX ADMIN — OXYTOCIN-SODIUM CHLORIDE 0.9% IV SOLN 30 UNIT/500ML 2 MILLIUNIT(S)/MIN: 30-0.9/5 SOLUTION at 12:34

## 2024-12-12 RX ADMIN — SODIUM CHLORIDE 125 MILLILITER(S): 9 INJECTION, SOLUTION INTRAVENOUS at 01:37

## 2024-12-12 RX ADMIN — Medication 600 MILLILITER(S): at 14:57

## 2024-12-12 RX ADMIN — Medication 1 APPLICATION(S): at 01:00

## 2024-12-12 RX ADMIN — Medication 1000 MILLIGRAM(S): at 15:02

## 2024-12-12 RX ADMIN — Medication 125 MICROGRAM(S): at 06:12

## 2024-12-12 RX ADMIN — Medication 975 MILLIGRAM(S): at 21:37

## 2024-12-13 LAB
HCT VFR BLD CALC: 33.5 % — LOW (ref 34.5–45)
HGB BLD-MCNC: 10.3 G/DL — LOW (ref 11.5–15.5)

## 2024-12-13 RX ORDER — LEVOTHYROXINE SODIUM 150 MCG
1 TABLET ORAL
Refills: 0 | DISCHARGE

## 2024-12-13 RX ORDER — ERGOCALCIFEROL (VITAMIN D2) 200 MCG/ML
0 DROPS ORAL
Refills: 0 | DISCHARGE

## 2024-12-13 RX ORDER — .BETA.-CAROTENE, SODIUM ACETATE, ASCORBIC ACID, CHOLECALCIFEROL, .ALPHA.-TOCOPHEROL ACETATE, DL-, THIAMINE MONONITRATE, RIBOFLAVIN, NIACINAMIDE, PYRIDOXINE HYDROCHLORIDE, FOLIC ACID, CYANOCOBALAMIN, CALCIUM CARBONATE, FERROUS FUMARATE, ZINC OXIDE AND CUPRIC OXIDE 2000; 2000; 120; 400; 22; 1.84; 3; 20; 10; 1; 12; 200; 27; 25; 2 [IU]/1; [IU]/1; MG/1; [IU]/1; MG/1; MG/1; MG/1; MG/1; MG/1; MG/1; UG/1; MG/1; MG/1; MG/1; MG/1
1 TABLET ORAL
Refills: 0 | DISCHARGE

## 2024-12-13 RX ORDER — IBUPROFEN 200 MG
1 TABLET ORAL
Qty: 0 | Refills: 0 | DISCHARGE
Start: 2024-12-13

## 2024-12-13 RX ORDER — ACETAMINOPHEN 500 MG/5ML
3 LIQUID (ML) ORAL
Qty: 0 | Refills: 0 | DISCHARGE
Start: 2024-12-13

## 2024-12-13 RX ADMIN — Medication 600 MILLIGRAM(S): at 06:30

## 2024-12-13 RX ADMIN — Medication 975 MILLIGRAM(S): at 16:00

## 2024-12-13 RX ADMIN — Medication 600 MILLIGRAM(S): at 23:59

## 2024-12-13 RX ADMIN — Medication 3 MILLILITER(S): at 14:00

## 2024-12-13 RX ADMIN — Medication 975 MILLIGRAM(S): at 07:18

## 2024-12-13 RX ADMIN — Medication 975 MILLIGRAM(S): at 21:12

## 2024-12-13 RX ADMIN — Medication 3 MILLILITER(S): at 00:45

## 2024-12-13 RX ADMIN — Medication 600 MILLIGRAM(S): at 18:00

## 2024-12-13 RX ADMIN — Medication 600 MILLIGRAM(S): at 18:36

## 2024-12-13 RX ADMIN — Medication 975 MILLIGRAM(S): at 08:00

## 2024-12-13 RX ADMIN — Medication 600 MILLIGRAM(S): at 12:30

## 2024-12-13 RX ADMIN — Medication 975 MILLIGRAM(S): at 15:21

## 2024-12-13 RX ADMIN — Medication 600 MILLIGRAM(S): at 05:01

## 2024-12-13 RX ADMIN — Medication 600 MILLIGRAM(S): at 12:03

## 2024-12-13 RX ADMIN — Medication 3 MILLILITER(S): at 06:20

## 2024-12-13 RX ADMIN — Medication 1 TABLET(S): at 12:03

## 2024-12-13 RX ADMIN — Medication 975 MILLIGRAM(S): at 22:00

## 2024-12-13 RX ADMIN — Medication 125 MICROGRAM(S): at 05:01

## 2024-12-14 VITALS
HEART RATE: 75 BPM | OXYGEN SATURATION: 100 % | DIASTOLIC BLOOD PRESSURE: 66 MMHG | TEMPERATURE: 98 F | RESPIRATION RATE: 17 BRPM | SYSTOLIC BLOOD PRESSURE: 130 MMHG

## 2024-12-14 RX ADMIN — Medication 600 MILLIGRAM(S): at 00:30

## 2024-12-14 RX ADMIN — Medication 1 TABLET(S): at 11:56

## 2024-12-14 RX ADMIN — Medication 125 MICROGRAM(S): at 05:11

## 2024-12-14 RX ADMIN — Medication 600 MILLIGRAM(S): at 12:30

## 2024-12-14 RX ADMIN — Medication 600 MILLIGRAM(S): at 11:56

## 2024-12-14 RX ADMIN — Medication 975 MILLIGRAM(S): at 09:10

## 2024-12-14 RX ADMIN — Medication 600 MILLIGRAM(S): at 06:18

## 2024-12-14 RX ADMIN — Medication 975 MILLIGRAM(S): at 08:39

## 2024-12-14 RX ADMIN — Medication 600 MILLIGRAM(S): at 08:39

## 2024-12-15 PROBLEM — O13.9 GESTATIONAL HYPERTENSION AFFECTING FIRST PREGNANCY: Status: ACTIVE | Noted: 2024-12-15

## 2024-12-15 PROBLEM — O13.9 GESTATIONAL HYPERTENSION AFFECTING SECOND PREGNANCY: Status: ACTIVE | Noted: 2024-12-15

## 2024-12-17 ENCOUNTER — NON-APPOINTMENT (OUTPATIENT)
Age: 39
End: 2024-12-17

## 2024-12-19 ENCOUNTER — NON-APPOINTMENT (OUTPATIENT)
Age: 39
End: 2024-12-19

## 2024-12-19 ENCOUNTER — APPOINTMENT (OUTPATIENT)
Dept: OBGYN | Facility: CLINIC | Age: 39
End: 2024-12-19
Payer: COMMERCIAL

## 2024-12-19 PROCEDURE — ZZZZZ: CPT

## 2025-01-14 ENCOUNTER — TRANSCRIPTION ENCOUNTER (OUTPATIENT)
Age: 40
End: 2025-01-14

## 2025-01-21 ENCOUNTER — APPOINTMENT (OUTPATIENT)
Dept: OBGYN | Facility: CLINIC | Age: 40
End: 2025-01-21
Payer: COMMERCIAL

## 2025-01-21 VITALS
SYSTOLIC BLOOD PRESSURE: 150 MMHG | BODY MASS INDEX: 31.07 KG/M2 | DIASTOLIC BLOOD PRESSURE: 93 MMHG | HEIGHT: 64 IN | WEIGHT: 182 LBS

## 2025-01-21 VITALS — DIASTOLIC BLOOD PRESSURE: 79 MMHG | SYSTOLIC BLOOD PRESSURE: 156 MMHG

## 2025-01-21 PROCEDURE — 0503F POSTPARTUM CARE VISIT: CPT

## 2025-03-12 NOTE — OB PROVIDER DELIVERY SUMMARY - NSMATERNALFETALCONCERNS_OBGYN_ALL_OB_FT
Pt james with hx of lung cancer, mets to brain c/o leg swelling and decreased mobility since leaving the hospital on Sunday. He apparently has not had any of his home medication. +pitting edema, + distended abd. Pt states he has not been able to get up and move like he normally does.    FETAL ALERT  RIGHT aorttic arch with left ductus arteriosus which makes up a vascular ring , the possibilty of a double aortic arch cannot be completely ruled out